# Patient Record
Sex: MALE | Race: WHITE | Employment: OTHER | URBAN - METROPOLITAN AREA
[De-identification: names, ages, dates, MRNs, and addresses within clinical notes are randomized per-mention and may not be internally consistent; named-entity substitution may affect disease eponyms.]

---

## 2017-03-28 ENCOUNTER — OFFICE VISIT (OUTPATIENT)
Dept: GASTROENTEROLOGY | Age: 77
End: 2017-03-28
Payer: MEDICARE

## 2017-03-28 VITALS
RESPIRATION RATE: 14 BRPM | TEMPERATURE: 98.1 F | WEIGHT: 209.7 LBS | OXYGEN SATURATION: 98 % | HEIGHT: 66 IN | SYSTOLIC BLOOD PRESSURE: 198 MMHG | DIASTOLIC BLOOD PRESSURE: 90 MMHG | BODY MASS INDEX: 33.7 KG/M2 | HEART RATE: 68 BPM

## 2017-03-28 DIAGNOSIS — K21.9 GASTROESOPHAGEAL REFLUX DISEASE WITHOUT ESOPHAGITIS: Primary | ICD-10-CM

## 2017-03-28 DIAGNOSIS — K25.9 GASTRIC ULCER, UNSPECIFIED CHRONICITY: ICD-10-CM

## 2017-03-28 DIAGNOSIS — E66.01 MORBID OBESITY, UNSPECIFIED OBESITY TYPE (HCC): ICD-10-CM

## 2017-03-28 PROCEDURE — 4040F PNEUMOC VAC/ADMIN/RCVD: CPT | Performed by: INTERNAL MEDICINE

## 2017-03-28 PROCEDURE — G8484 FLU IMMUNIZE NO ADMIN: HCPCS | Performed by: INTERNAL MEDICINE

## 2017-03-28 PROCEDURE — 1036F TOBACCO NON-USER: CPT | Performed by: INTERNAL MEDICINE

## 2017-03-28 PROCEDURE — G8427 DOCREV CUR MEDS BY ELIG CLIN: HCPCS | Performed by: INTERNAL MEDICINE

## 2017-03-28 PROCEDURE — 99214 OFFICE O/P EST MOD 30 MIN: CPT | Performed by: INTERNAL MEDICINE

## 2017-03-28 PROCEDURE — G8417 CALC BMI ABV UP PARAM F/U: HCPCS | Performed by: INTERNAL MEDICINE

## 2017-03-28 PROCEDURE — 1123F ACP DISCUSS/DSCN MKR DOCD: CPT | Performed by: INTERNAL MEDICINE

## 2017-03-28 RX ORDER — OMEPRAZOLE 20 MG/1
20 CAPSULE, DELAYED RELEASE ORAL
Qty: 90 CAPSULE | Refills: 5 | Status: SHIPPED | OUTPATIENT
Start: 2017-03-28 | End: 2018-01-04 | Stop reason: SDUPTHER

## 2017-03-28 ASSESSMENT — ENCOUNTER SYMPTOMS
GASTROINTESTINAL NEGATIVE: 1
EYES NEGATIVE: 1
RECTAL PAIN: 0
BLOOD IN STOOL: 0
ANAL BLEEDING: 0
RESPIRATORY NEGATIVE: 1
VOMITING: 0
DIARRHEA: 0
ALLERGIC/IMMUNOLOGIC NEGATIVE: 1
NAUSEA: 0
CONSTIPATION: 0
ABDOMINAL DISTENTION: 0
ABDOMINAL PAIN: 0

## 2017-04-27 ENCOUNTER — OFFICE VISIT (OUTPATIENT)
Dept: UROLOGY | Age: 77
End: 2017-04-27
Payer: MEDICARE

## 2017-04-27 VITALS
TEMPERATURE: 98.4 F | BODY MASS INDEX: 33.69 KG/M2 | HEART RATE: 59 BPM | WEIGHT: 209.66 LBS | HEIGHT: 66 IN | DIASTOLIC BLOOD PRESSURE: 85 MMHG | SYSTOLIC BLOOD PRESSURE: 162 MMHG

## 2017-04-27 DIAGNOSIS — C61 PROSTATE CANCER (HCC): Primary | ICD-10-CM

## 2017-04-27 PROCEDURE — 96402 CHEMO HORMON ANTINEOPL SQ/IM: CPT | Performed by: NURSE PRACTITIONER

## 2017-04-27 PROCEDURE — 1036F TOBACCO NON-USER: CPT | Performed by: NURSE PRACTITIONER

## 2017-08-24 DIAGNOSIS — C61 PROSTATE CANCER (HCC): ICD-10-CM

## 2017-08-31 ENCOUNTER — OFFICE VISIT (OUTPATIENT)
Dept: UROLOGY | Age: 77
End: 2017-08-31
Payer: MEDICARE

## 2017-08-31 VITALS
HEART RATE: 62 BPM | DIASTOLIC BLOOD PRESSURE: 89 MMHG | HEIGHT: 66 IN | BODY MASS INDEX: 33.43 KG/M2 | SYSTOLIC BLOOD PRESSURE: 169 MMHG | TEMPERATURE: 98.4 F | WEIGHT: 208 LBS

## 2017-08-31 DIAGNOSIS — C61 PROSTATE CANCER (HCC): Primary | ICD-10-CM

## 2017-08-31 PROCEDURE — 1036F TOBACCO NON-USER: CPT | Performed by: NURSE PRACTITIONER

## 2017-08-31 PROCEDURE — 96402 CHEMO HORMON ANTINEOPL SQ/IM: CPT | Performed by: NURSE PRACTITIONER

## 2017-12-27 DIAGNOSIS — C61 PROSTATE CANCER (HCC): ICD-10-CM

## 2018-01-02 ENCOUNTER — OFFICE VISIT (OUTPATIENT)
Dept: UROLOGY | Age: 78
End: 2018-01-02
Payer: MEDICARE

## 2018-01-02 VITALS
SYSTOLIC BLOOD PRESSURE: 201 MMHG | BODY MASS INDEX: 34.04 KG/M2 | DIASTOLIC BLOOD PRESSURE: 98 MMHG | HEIGHT: 66 IN | HEART RATE: 56 BPM | TEMPERATURE: 98.2 F | WEIGHT: 211.8 LBS

## 2018-01-02 DIAGNOSIS — C61 PROSTATE CANCER (HCC): Primary | ICD-10-CM

## 2018-01-02 PROCEDURE — 96402 CHEMO HORMON ANTINEOPL SQ/IM: CPT | Performed by: UROLOGY

## 2018-01-02 PROCEDURE — 99212 OFFICE O/P EST SF 10 MIN: CPT | Performed by: UROLOGY

## 2018-01-02 ASSESSMENT — ENCOUNTER SYMPTOMS
VOMITING: 0
EYE REDNESS: 0
WHEEZING: 0
COUGH: 0
SHORTNESS OF BREATH: 0
EYE PAIN: 0
BACK PAIN: 0
ABDOMINAL PAIN: 0
NAUSEA: 0
COLOR CHANGE: 0

## 2018-01-04 ENCOUNTER — OFFICE VISIT (OUTPATIENT)
Dept: GASTROENTEROLOGY | Age: 78
End: 2018-01-04
Payer: MEDICARE

## 2018-01-04 VITALS
WEIGHT: 211.7 LBS | DIASTOLIC BLOOD PRESSURE: 88 MMHG | SYSTOLIC BLOOD PRESSURE: 138 MMHG | HEIGHT: 66 IN | BODY MASS INDEX: 34.02 KG/M2 | RESPIRATION RATE: 12 BRPM | HEART RATE: 78 BPM | OXYGEN SATURATION: 100 %

## 2018-01-04 DIAGNOSIS — K21.9 GASTROESOPHAGEAL REFLUX DISEASE WITHOUT ESOPHAGITIS: Primary | ICD-10-CM

## 2018-01-04 DIAGNOSIS — E66.8 MODERATE OBESITY: ICD-10-CM

## 2018-01-04 DIAGNOSIS — Z86.010 HISTORY OF COLON POLYPS: ICD-10-CM

## 2018-01-04 PROCEDURE — 1123F ACP DISCUSS/DSCN MKR DOCD: CPT | Performed by: INTERNAL MEDICINE

## 2018-01-04 PROCEDURE — G8427 DOCREV CUR MEDS BY ELIG CLIN: HCPCS | Performed by: INTERNAL MEDICINE

## 2018-01-04 PROCEDURE — 4040F PNEUMOC VAC/ADMIN/RCVD: CPT | Performed by: INTERNAL MEDICINE

## 2018-01-04 PROCEDURE — G8417 CALC BMI ABV UP PARAM F/U: HCPCS | Performed by: INTERNAL MEDICINE

## 2018-01-04 PROCEDURE — G8484 FLU IMMUNIZE NO ADMIN: HCPCS | Performed by: INTERNAL MEDICINE

## 2018-01-04 PROCEDURE — 99214 OFFICE O/P EST MOD 30 MIN: CPT | Performed by: INTERNAL MEDICINE

## 2018-01-04 PROCEDURE — 1036F TOBACCO NON-USER: CPT | Performed by: INTERNAL MEDICINE

## 2018-01-04 RX ORDER — OMEPRAZOLE 20 MG/1
20 CAPSULE, DELAYED RELEASE ORAL
Qty: 90 CAPSULE | Refills: 5 | Status: SHIPPED | OUTPATIENT
Start: 2018-01-04 | End: 2019-03-25 | Stop reason: SDUPTHER

## 2018-01-04 ASSESSMENT — ENCOUNTER SYMPTOMS
ABDOMINAL PAIN: 0
NAUSEA: 0
VOMITING: 0
EYES NEGATIVE: 1
BLOOD IN STOOL: 0
RESPIRATORY NEGATIVE: 1
ABDOMINAL DISTENTION: 0
DIARRHEA: 0
ANAL BLEEDING: 0
GASTROINTESTINAL NEGATIVE: 1
RECTAL PAIN: 0
CONSTIPATION: 0
ALLERGIC/IMMUNOLOGIC NEGATIVE: 1

## 2018-03-03 ENCOUNTER — APPOINTMENT (OUTPATIENT)
Dept: GENERAL RADIOLOGY | Age: 78
End: 2018-03-03
Payer: MEDICARE

## 2018-03-03 ENCOUNTER — HOSPITAL ENCOUNTER (EMERGENCY)
Age: 78
Discharge: HOME | End: 2018-03-03
Attending: EMERGENCY MEDICINE
Payer: MEDICARE

## 2018-03-03 VITALS
RESPIRATION RATE: 16 BRPM | SYSTOLIC BLOOD PRESSURE: 168 MMHG | OXYGEN SATURATION: 100 % | TEMPERATURE: 97.7 F | HEART RATE: 69 BPM | HEIGHT: 66 IN | WEIGHT: 200 LBS | BODY MASS INDEX: 32.14 KG/M2 | DIASTOLIC BLOOD PRESSURE: 75 MMHG

## 2018-03-03 DIAGNOSIS — J92.9 PLEURAL THICKENING: ICD-10-CM

## 2018-03-03 DIAGNOSIS — R07.81 RIB PAIN ON RIGHT SIDE: Primary | ICD-10-CM

## 2018-03-03 PROCEDURE — 71101 X-RAY EXAM UNILAT RIBS/CHEST: CPT

## 2018-03-03 PROCEDURE — 94664 DEMO&/EVAL PT USE INHALER: CPT

## 2018-03-03 PROCEDURE — 99283 EMERGENCY DEPT VISIT LOW MDM: CPT

## 2018-03-03 RX ORDER — HYDROCODONE BITARTRATE AND ACETAMINOPHEN 5; 325 MG/1; MG/1
1 TABLET ORAL EVERY 6 HOURS PRN
Qty: 15 TABLET | Refills: 0 | Status: SHIPPED | OUTPATIENT
Start: 2018-03-03 | End: 2018-03-06

## 2018-03-03 ASSESSMENT — PAIN SCALES - GENERAL: PAINLEVEL_OUTOF10: 7

## 2018-03-03 ASSESSMENT — PAIN DESCRIPTION - PAIN TYPE: TYPE: ACUTE PAIN

## 2018-03-03 ASSESSMENT — PAIN DESCRIPTION - LOCATION: LOCATION: RIB CAGE

## 2018-03-03 ASSESSMENT — PAIN DESCRIPTION - ORIENTATION: ORIENTATION: RIGHT

## 2018-03-03 NOTE — ED PROVIDER NOTES
exhibits no mass, no laceration, no crepitus, no edema, no deformity, no swelling and no retraction. Pain to palpitation over right lateral ribs 8-10. No crepitus, retractions, palpable fracture. No bruising, cullens or grey dyer sign. Abdominal: Soft. Normal appearance. He exhibits no distension. There is no tenderness. There is no rigidity, no rebound and no guarding. Musculoskeletal: He exhibits tenderness. Neurological: He is alert and oriented to person, place, and time. Skin: Skin is warm, dry and intact. No abrasion, no bruising, no ecchymosis and no rash noted. He is not diaphoretic. No erythema. Nursing note and vitals reviewed. DIAGNOSTIC RESULTS     EKG: All EKG's are interpreted by the Emergency Department Physician who either signs or Co-signs this chart in the absence of a cardiologist.        RADIOLOGY:   All plain film, CT, MRI, and formal ultrasound images (except ED bedside ultrasound) are read by the radiologist, see report below, unless otherwise noted here or in MDM. XR RIBS RIGHT INCLUDE CHEST (MIN 3 VIEWS)   Final Result   No acute rib fracture or complication of rib fracture. Xr Ribs Right Include Chest (min 3 Views)    Result Date: 3/3/2018  EXAMINATION: 4 RIGHT RIB SERIES WITH FRONTAL VIEW OF THE CHEST 3/3/2018 9:41 am COMPARISON: None. HISTORY: ORDERING SYSTEM PROVIDED HISTORY: pain over lateral 8-10 ribs TECHNOLOGIST PROVIDED HISTORY: Reason for exam:->pain over lateral 8-10 ribs Ordering Physician Provided Reason for Exam: right lower lateral rib pain Acuity: Unknown Type of Exam: Unknown FINDINGS: Heart size is enlarged. There is suggested apical pleural thickening bilaterally. There is no lung consolidation, pleural effusion or pneumothorax. There is no acute displaced rib fracture     No acute rib fracture or complication of rib fracture.          LABS:  Labs Reviewed - No data to display    All other labs were within normal range or not returned as of this dictation. EMERGENCY DEPARTMENT COURSE and DIFFERENTIAL DIAGNOSIS/MDM:   Vitals:    Vitals:    03/03/18 0922   BP: (!) 168/75   Pulse: 69   Resp: 16   Temp: 97.7 °F (36.5 °C)   SpO2: 100%   Weight: 200 lb (90.7 kg)   Height: 5' 6\" (1.676 m)         Patient instructed to return to the emergency room if symptoms worsen, return, or any other concern right away which is agreed by the patient    ED MEDS:  Orders Placed This Encounter   Medications    HYDROcodone-acetaminophen (NORCO) 5-325 MG per tablet     Sig: Take 1 tablet by mouth every 6 hours as needed for Pain for up to 3 days . Take lowest dose possible to manage pain. Dispense:  15 tablet     Refill:  0         CONSULTS:  None    PROCEDURES:  None      FINAL IMPRESSION      1. Rib pain on right side    2. Pleural thickening          DISPOSITION/PLAN   DISPOSITION Decision To Discharge    PATIENT REFERRED TO:  Walter Tomlinson    Call in 1 day      [facility] ED  [facility] [address] [phone]    If symptoms worsen      DISCHARGE MEDICATIONS:  New Prescriptions    HYDROCODONE-ACETAMINOPHEN (NORCO) 5-325 MG PER TABLET    Take 1 tablet by mouth every 6 hours as needed for Pain for up to 3 days . Take lowest dose possible to manage pain. Summation      Patient Course: Well appearing, NAD, 75 y/o male with right rib pain that began after lifting suitcases. Pain over right lateral ribs with palpitation, movement, lying. No sob, abd pain, other chest pain. Exam reveals tenderness to palpitation over right lateral ribs. Pain with movement of arm or twisting of torso. Xray shows no rib fracture. Pleural thickening noted. 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 Did inform patient to follow up with PCP for further evaluation. Resp did instruct patient how to use incentive spirometry. At this time, pt is clinically stable for discharge home.   Low suspicion for PE, MI,

## 2018-03-03 NOTE — ED PROVIDER NOTES
[facility] [address] [facility] ED  Emergency Department  Independent Attestation     Pt Name: Israel Alford  MRN: [id number]  [place] 1940  Date of evaluation: 3/3/18       Israel Alford is a 76 y.o. male who presents with No chief complaint on file. I was personally available for consultation in the Emergency Department. I have reviewed the chart and agree with the documentation as recorded by the [facility], including the assessment, treatment plan and disposition.     Rosey Messer DO  Attending Emergency Physician  [address] [facility] ED      (Please note that portions of this note were completed with a voice recognition program.  Efforts were made to edit the dictations but occasionally words are mis-transcribed.)        Rosey Messer DO  03/03/18 7447

## 2018-06-26 LAB — PROSTATE SPECIFIC ANTIGEN: NORMAL NG/ML

## 2018-07-03 ENCOUNTER — OFFICE VISIT (OUTPATIENT)
Dept: UROLOGY | Age: 78
End: 2018-07-03
Payer: MEDICARE

## 2018-07-03 ENCOUNTER — TELEPHONE (OUTPATIENT)
Dept: UROLOGY | Age: 78
End: 2018-07-03

## 2018-07-03 VITALS
HEIGHT: 66 IN | TEMPERATURE: 98.2 F | WEIGHT: 203 LBS | SYSTOLIC BLOOD PRESSURE: 163 MMHG | DIASTOLIC BLOOD PRESSURE: 96 MMHG | HEART RATE: 79 BPM | BODY MASS INDEX: 32.62 KG/M2

## 2018-07-03 DIAGNOSIS — C61 PROSTATE CANCER (HCC): Primary | ICD-10-CM

## 2018-07-03 PROCEDURE — 1123F ACP DISCUSS/DSCN MKR DOCD: CPT | Performed by: UROLOGY

## 2018-07-03 PROCEDURE — 99212 OFFICE O/P EST SF 10 MIN: CPT | Performed by: UROLOGY

## 2018-07-03 PROCEDURE — 1036F TOBACCO NON-USER: CPT | Performed by: UROLOGY

## 2018-07-03 PROCEDURE — G8427 DOCREV CUR MEDS BY ELIG CLIN: HCPCS | Performed by: UROLOGY

## 2018-07-03 PROCEDURE — 1101F PT FALLS ASSESS-DOCD LE1/YR: CPT | Performed by: UROLOGY

## 2018-07-03 PROCEDURE — 4040F PNEUMOC VAC/ADMIN/RCVD: CPT | Performed by: UROLOGY

## 2018-07-03 PROCEDURE — G8417 CALC BMI ABV UP PARAM F/U: HCPCS | Performed by: UROLOGY

## 2018-07-03 PROCEDURE — 96402 CHEMO HORMON ANTINEOPL SQ/IM: CPT | Performed by: UROLOGY

## 2018-07-03 RX ORDER — VALSARTAN 80 MG/1
TABLET ORAL
COMMUNITY
Start: 2018-06-15

## 2018-07-03 NOTE — PROGRESS NOTES
After obtaining consent, and per orders of Dr. Jerome Moran, injection of Lupron given in Left arm by Moises Pop. Patient instructed to remain in clinic for 20 minutes afterwards, and to report any adverse reaction to me immediately.
capsule Take 1 capsule by mouth every morning (before breakfast) 90 capsule 5    aspirin 81 MG EC tablet Take 81 mg by mouth daily.  Multiple Vitamin (MULTIVITAMIN PO) Take  by mouth daily.  Cholecalciferol (VITAMIN D) 2000 UNITS TABS Take  by mouth Daily.  cetirizine (ZYRTEC) 10 MG tablet Take 10 mg by mouth daily.  simvastatin (ZOCOR) 40 MG tablet Take 40 mg by mouth nightly. Patient has no known allergies. History   Smoking Status    Never Smoker   Smokeless Tobacco    Never Used     (If patient a smoker, smoking cessation counseling offered)    History   Alcohol Use    Yes     Comment: occasionally       REVIEW OF SYSTEMS:  Review of Systems    Physical Exam:      Vitals:    07/03/18 1353   BP: (!) 163/96   Pulse: 79   Temp:      Body mass index is 32.77 kg/m². Patient is a [de-identified] y.o. male in no acute distress and alert and oriented to person, place and time. Physical Exam  Constitutional: Patient in no acute distress. Neuro: Alert and oriented to person, place and time. Psych: Mood normal, affect normal  Lungs: Respiratory effort is normal  Cardiovascular: Warm & Pink  Abdomen: Soft, non-tender, non-distended with no CVA,  No flank tenderness,  Or hepatosplenomegaly   Lymphatics: No palpable lymphadenopathy. Bladder non-tender and not distended. Musculoskeletal: Normal gait and station      Assessment and Plan      1. Prostate cancer [facility])           Plan:         Continues to do well. Lupron today. F/U in 6 months with a PSA. Vit D and calcium  Return in about 6 months (around 1/3/2019) for Labs. Prescriptions Ordered:  No orders of the defined types were placed in this encounter.      Orders Placed:  Orders Placed This Encounter   Procedures    PSA, Diagnostic     Standing Status:   Future     Standing Expiration Date:   7/3/2019          Peewee Hinton MD

## 2018-07-05 DIAGNOSIS — C61 PROSTATE CANCER (HCC): ICD-10-CM

## 2018-12-06 ENCOUNTER — OFFICE VISIT (OUTPATIENT)
Dept: GASTROENTEROLOGY | Age: 78
End: 2018-12-06
Payer: MEDICARE

## 2018-12-06 VITALS
DIASTOLIC BLOOD PRESSURE: 70 MMHG | SYSTOLIC BLOOD PRESSURE: 136 MMHG | BODY MASS INDEX: 32.77 KG/M2 | HEART RATE: 68 BPM | WEIGHT: 203 LBS

## 2018-12-06 DIAGNOSIS — K21.9 GASTROESOPHAGEAL REFLUX DISEASE WITHOUT ESOPHAGITIS: Primary | ICD-10-CM

## 2018-12-06 DIAGNOSIS — Z86.010 HISTORY OF COLON POLYPS: ICD-10-CM

## 2018-12-06 PROCEDURE — G8484 FLU IMMUNIZE NO ADMIN: HCPCS | Performed by: INTERNAL MEDICINE

## 2018-12-06 PROCEDURE — G8417 CALC BMI ABV UP PARAM F/U: HCPCS | Performed by: INTERNAL MEDICINE

## 2018-12-06 PROCEDURE — 1036F TOBACCO NON-USER: CPT | Performed by: INTERNAL MEDICINE

## 2018-12-06 PROCEDURE — 99214 OFFICE O/P EST MOD 30 MIN: CPT | Performed by: INTERNAL MEDICINE

## 2018-12-06 PROCEDURE — 4040F PNEUMOC VAC/ADMIN/RCVD: CPT | Performed by: INTERNAL MEDICINE

## 2018-12-06 PROCEDURE — G8427 DOCREV CUR MEDS BY ELIG CLIN: HCPCS | Performed by: INTERNAL MEDICINE

## 2018-12-06 PROCEDURE — 1123F ACP DISCUSS/DSCN MKR DOCD: CPT | Performed by: INTERNAL MEDICINE

## 2018-12-06 PROCEDURE — 1101F PT FALLS ASSESS-DOCD LE1/YR: CPT | Performed by: INTERNAL MEDICINE

## 2018-12-06 ASSESSMENT — ENCOUNTER SYMPTOMS
NAUSEA: 0
DIARRHEA: 0
ABDOMINAL PAIN: 0
RECTAL PAIN: 0
BACK PAIN: 0
BLOOD IN STOOL: 0
SINUS PAIN: 0
TROUBLE SWALLOWING: 0
CHEST TIGHTNESS: 0
CONSTIPATION: 0
SORE THROAT: 0
COUGH: 0
ABDOMINAL DISTENTION: 0
ANAL BLEEDING: 0
VOMITING: 0
SHORTNESS OF BREATH: 0
SINUS PRESSURE: 0

## 2018-12-06 NOTE — PROGRESS NOTES
Subjective:      Patient ID: Sunni Swift is a [de-identified] y.o. male. Dr. Bullock Route our mutual patient Sunni Swift was seen  for   1. Gastroesophageal reflux disease without esophagitis    2. History of colon polyps     . Here for f/u after 10 months  Has been on PPI  Pt is clinically feeling well GI wise  Has No significant abdominal pains, bloating or cramping  Has no sig GERD symptoms  Has no Dysphagia, No Nausea or any vomiting  Denies any rectal bleeding or any melena  Denies any sig constipation or any diarrhea symptoms  Weight is stable and appetite is generally good. Past Medical, Family, and Social History reviewed and does contribute to the patient presenting condition. patient\"s PMH/PSH,SH,PSYCH hx, MEDs, ALLERGIES, and ROS was all reviewed and updated ion the appropriate sections        Gastroesophageal Reflux   He reports no abdominal pain, no chest pain, no coughing, no nausea or no sore throat. Pertinent negatives include no fatigue. Review of Systems   Constitutional: Negative for appetite change, fatigue and fever. HENT: Negative for congestion, sinus pain, sinus pressure, sore throat and trouble swallowing. Eyes: Positive for visual disturbance (glasses). Respiratory: Negative for cough, chest tightness and shortness of breath. Cardiovascular: Negative for chest pain, palpitations and leg swelling. Gastrointestinal: Negative for abdominal distention, abdominal pain, anal bleeding, blood in stool, constipation, diarrhea, nausea, rectal pain and vomiting. Endocrine: Negative. Genitourinary: Negative for difficulty urinating. Musculoskeletal: Negative for arthralgias, back pain and gait problem. Skin: Negative. Allergic/Immunologic: Negative for environmental allergies and food allergies. Neurological: Negative for dizziness, weakness, light-headedness and headaches. Hematological: Does not bruise/bleed easily.    Psychiatric/Behavioral: Negative for sleep disturbance. The patient is not nervous/anxious. Reviewed and agree  Objective:   Physical Exam   Constitutional: He is oriented to person, place, and time. He appears well-developed and well-nourished. Mild obesity    HENT:   Head: Normocephalic and atraumatic. Eyes: Pupils are equal, round, and reactive to light. Conjunctivae and EOM are normal.   Neck: Normal range of motion. Neck supple. Cardiovascular: Normal rate and regular rhythm. Pulmonary/Chest: Effort normal and breath sounds normal.   Abdominal: Soft. Bowel sounds are normal.   NON TENDER, NON DISTENTED  LIVER SPLEEN AND HERNIAS ARE NOT  PALPABLE  BOWEL SOUNDS ARE POSITIVE      Genitourinary: Rectum normal.   Musculoskeletal: Normal range of motion. Neurological: He is alert and oriented to person, place, and time. He has normal reflexes. Skin: Skin is warm. Vitals reviewed. Assessment:      As above      Plan:      Cont PPI - renew    Pt was discussed in detail about the possible side effects of proton pump inhibiter therapy. He was explained about the possibility of calcium and magnesium malabsorption and was advised to start taking calcium supplements with Vit D. Some over the counter regimens were explained to patient. Some dietary advices were also given. He has verbalized understanding and agreement to this. Pt seems to have signs and symptoms consistent with GERD, acid indigestion and heartburns. He was discussed  in detail about some possible life style and dietary modifications. He was stressed about the maintenance  of appropriate weight and effect of obesity contributing to reflux symptoms. Routine exercise was streesed. Avoidance of Caffeine, nicotine and chocolate were explained. Pt was asked to avoid spices grease and fried food. Advices were also given about avoidance of any kind of fast foods, soda pops and high energy drinks.     Pt was advised to place two small block under the head end of the bed which may help with night time reflux. Was advised not to eat any thin at least 2-3 hrs before going to bed and walk especially after dinner    Pt has verbalized understanding and agreement to this plan. Pt was advised in detail about some life style and dietary modifications. He was advised about avoidance of caffeine, nicotine and chocolate. Pt was also told to stay away from any kind of fast foods, soda pops. He was also advised to avoid lots of spices, grease and fried food etc.     Instructions were also given about trying to arrange the timing, quality and quantity of food. Instructions were given about using ample amount of fiber including dietary and supplemental fiber either metamucil, bennafiber or citrucell etc.  Pt was advised about drinking ample amount of water without any colors or chemicals. Stress was given about regular exercise. Pt has verbalized understanding and agreement to these modifications.                 Boris Cesar MD

## 2019-01-08 ENCOUNTER — OFFICE VISIT (OUTPATIENT)
Dept: UROLOGY | Age: 79
End: 2019-01-08
Payer: MEDICARE

## 2019-01-08 VITALS
BODY MASS INDEX: 32.69 KG/M2 | HEIGHT: 66 IN | HEART RATE: 75 BPM | DIASTOLIC BLOOD PRESSURE: 84 MMHG | WEIGHT: 203.4 LBS | SYSTOLIC BLOOD PRESSURE: 132 MMHG | TEMPERATURE: 98.4 F

## 2019-01-08 DIAGNOSIS — C61 PROSTATE CANCER (HCC): Primary | ICD-10-CM

## 2019-01-08 PROCEDURE — G8484 FLU IMMUNIZE NO ADMIN: HCPCS | Performed by: UROLOGY

## 2019-01-08 PROCEDURE — 1036F TOBACCO NON-USER: CPT | Performed by: UROLOGY

## 2019-01-08 PROCEDURE — 4040F PNEUMOC VAC/ADMIN/RCVD: CPT | Performed by: UROLOGY

## 2019-01-08 PROCEDURE — G8427 DOCREV CUR MEDS BY ELIG CLIN: HCPCS | Performed by: UROLOGY

## 2019-01-08 PROCEDURE — 99212 OFFICE O/P EST SF 10 MIN: CPT | Performed by: UROLOGY

## 2019-01-08 PROCEDURE — G8417 CALC BMI ABV UP PARAM F/U: HCPCS | Performed by: UROLOGY

## 2019-01-08 PROCEDURE — 1101F PT FALLS ASSESS-DOCD LE1/YR: CPT | Performed by: UROLOGY

## 2019-01-08 PROCEDURE — 96402 CHEMO HORMON ANTINEOPL SQ/IM: CPT | Performed by: UROLOGY

## 2019-01-08 PROCEDURE — 1123F ACP DISCUSS/DSCN MKR DOCD: CPT | Performed by: UROLOGY

## 2019-01-08 ASSESSMENT — ENCOUNTER SYMPTOMS
NAUSEA: 0
EYE REDNESS: 0
VOMITING: 0
EYE PAIN: 0
SHORTNESS OF BREATH: 0
COUGH: 0
ABDOMINAL PAIN: 0
COLOR CHANGE: 0
BACK PAIN: 0
WHEEZING: 0

## 2019-03-27 RX ORDER — OMEPRAZOLE 20 MG/1
CAPSULE, DELAYED RELEASE ORAL
Qty: 90 CAPSULE | Refills: 5 | Status: SHIPPED | OUTPATIENT
Start: 2019-03-27 | End: 2020-03-30

## 2019-07-09 ENCOUNTER — OFFICE VISIT (OUTPATIENT)
Dept: UROLOGY | Age: 79
End: 2019-07-09
Payer: MEDICARE

## 2019-07-09 VITALS
HEIGHT: 66 IN | TEMPERATURE: 98.6 F | WEIGHT: 191 LBS | BODY MASS INDEX: 30.7 KG/M2 | SYSTOLIC BLOOD PRESSURE: 124 MMHG | DIASTOLIC BLOOD PRESSURE: 82 MMHG

## 2019-07-09 DIAGNOSIS — R35.1 NOCTURIA: Primary | ICD-10-CM

## 2019-07-09 DIAGNOSIS — C61 PROSTATE CANCER (HCC): ICD-10-CM

## 2019-07-09 PROCEDURE — 1123F ACP DISCUSS/DSCN MKR DOCD: CPT | Performed by: UROLOGY

## 2019-07-09 PROCEDURE — G8417 CALC BMI ABV UP PARAM F/U: HCPCS | Performed by: UROLOGY

## 2019-07-09 PROCEDURE — 1036F TOBACCO NON-USER: CPT | Performed by: UROLOGY

## 2019-07-09 PROCEDURE — 4040F PNEUMOC VAC/ADMIN/RCVD: CPT | Performed by: UROLOGY

## 2019-07-09 PROCEDURE — 99213 OFFICE O/P EST LOW 20 MIN: CPT | Performed by: UROLOGY

## 2019-07-09 PROCEDURE — 96402 CHEMO HORMON ANTINEOPL SQ/IM: CPT | Performed by: UROLOGY

## 2019-07-09 PROCEDURE — G8427 DOCREV CUR MEDS BY ELIG CLIN: HCPCS | Performed by: UROLOGY

## 2019-07-09 ASSESSMENT — ENCOUNTER SYMPTOMS
CONSTIPATION: 0
WHEEZING: 0
EYE PAIN: 0
VOMITING: 0
DIARRHEA: 0
EYE REDNESS: 0
NAUSEA: 0
SHORTNESS OF BREATH: 0
ABDOMINAL PAIN: 0
COUGH: 0
BACK PAIN: 0

## 2019-07-09 NOTE — PROGRESS NOTES
Review of Systems   Constitutional: Negative for appetite change, chills and fever. Eyes: Negative for pain, redness and visual disturbance. Respiratory: Negative for cough, shortness of breath and wheezing. Cardiovascular: Negative for chest pain and leg swelling. Gastrointestinal: Negative for abdominal pain, constipation, diarrhea, nausea and vomiting. Genitourinary: Negative for difficulty urinating, dysuria, flank pain, frequency, hematuria and urgency. Musculoskeletal: Negative for back pain, joint swelling and myalgias. Skin: Negative for rash and wound. Neurological: Negative for dizziness, tremors and numbness. Hematological: Does not bruise/bleed easily.
Soft, non-tender, non-distended with no CVA,  No flank tenderness,  Or hepatosplenomegaly   Lymphatics: No palpablelymphadenopathy. Bladder non-tender and not distended. Musculoskeletal: Normal gait and station      Assessment and Plan      1. Nocturia    2. Prostate cancer ([facility] [address]           Plan:          Doing well  Lupron today  Continue Vit D and calcium  F/U in 6 months with Lupron and a PSA. Return in about 6 months (around 1/9/2020) for Labs. Prescriptions Ordered:  No orders of the defined types were placed in this encounter. Orders Placed:  Orders Placed This Encounter   Procedures    PSA, Diagnostic     Standing Status:   Future     Standing Expiration Date:   7/8/2020           Jasmin Chavarria MD    Agree with the ROS entered by the MA.

## 2020-01-14 ENCOUNTER — OFFICE VISIT (OUTPATIENT)
Dept: UROLOGY | Age: 80
End: 2020-01-14
Payer: MEDICARE

## 2020-01-14 VITALS
DIASTOLIC BLOOD PRESSURE: 80 MMHG | BODY MASS INDEX: 32.59 KG/M2 | HEART RATE: 87 BPM | HEIGHT: 66 IN | SYSTOLIC BLOOD PRESSURE: 122 MMHG | WEIGHT: 202.8 LBS | TEMPERATURE: 98.4 F

## 2020-01-14 PROCEDURE — 1123F ACP DISCUSS/DSCN MKR DOCD: CPT | Performed by: UROLOGY

## 2020-01-14 PROCEDURE — 1036F TOBACCO NON-USER: CPT | Performed by: UROLOGY

## 2020-01-14 PROCEDURE — 99213 OFFICE O/P EST LOW 20 MIN: CPT | Performed by: UROLOGY

## 2020-01-14 PROCEDURE — G8484 FLU IMMUNIZE NO ADMIN: HCPCS | Performed by: UROLOGY

## 2020-01-14 PROCEDURE — 4040F PNEUMOC VAC/ADMIN/RCVD: CPT | Performed by: UROLOGY

## 2020-01-14 PROCEDURE — G8427 DOCREV CUR MEDS BY ELIG CLIN: HCPCS | Performed by: UROLOGY

## 2020-01-14 PROCEDURE — G8417 CALC BMI ABV UP PARAM F/U: HCPCS | Performed by: UROLOGY

## 2020-01-14 PROCEDURE — 96402 CHEMO HORMON ANTINEOPL SQ/IM: CPT | Performed by: UROLOGY

## 2020-01-14 RX ORDER — SULFAMETHOXAZOLE AND TRIMETHOPRIM 800; 160 MG/1; MG/1
TABLET ORAL
COMMUNITY
Start: 2020-01-13 | End: 2020-07-28 | Stop reason: ALTCHOICE

## 2020-01-14 RX ORDER — LISINOPRIL 20 MG/1
TABLET ORAL
COMMUNITY
Start: 2019-12-07 | End: 2021-01-26 | Stop reason: ALTCHOICE

## 2020-01-14 ASSESSMENT — ENCOUNTER SYMPTOMS
SHORTNESS OF BREATH: 0
BACK PAIN: 0
CONSTIPATION: 0
NAUSEA: 0
DIARRHEA: 0
ABDOMINAL PAIN: 0
WHEEZING: 0
COUGH: 0
EYE PAIN: 0
VOMITING: 0
EYE REDNESS: 0

## 2020-01-14 NOTE — PROGRESS NOTES
Review of Systems   Constitutional: Negative for appetite change, chills and fatigue. Eyes: Negative for pain, redness and visual disturbance. Respiratory: Negative for cough, shortness of breath and wheezing. Cardiovascular: Negative for chest pain and leg swelling. Gastrointestinal: Negative for abdominal pain, constipation, diarrhea, nausea and vomiting. Genitourinary: Negative for difficulty urinating, dysuria, flank pain, frequency, hematuria and urgency. Musculoskeletal: Negative for back pain, joint swelling and myalgias. Skin: Negative for rash and wound. Neurological: Negative for dizziness, weakness and numbness. Hematological: Does not bruise/bleed easily.
Lab/Culture results: PSA 0.07    Imaging Reviewed during this Office Visit: none  (results were independently reviewed by physician and radiology report verified)    PAST MEDICAL, FAMILY AND SOCIAL HISTORY UPDATE:  Past Medical History:   Diagnosis Date    Abdominal pain     Cancer ([address]     Hx of prostate    Gastric ulcer     GERD (gastroesophageal reflux disease)      Past Surgical History:   Procedure Laterality Date    COLONOSCOPY  2010    per pt, \"some polyps\"    UPPER GASTROINTESTINAL ENDOSCOPY  6/27/11,  11/7/2011     No family history on file. Outpatient Medications Marked as Taking for the 1/14/20 encounter (Office Visit) with Melia Ríos MD   Medication Sig Dispense Refill    sulfamethoxazole-trimethoprim (BACTRIM DS;SEPTRA DS) 800-160 MG per tablet       lisinopril (PRINIVIL;ZESTRIL) 20 MG tablet TAKE 1 TABLET BY MOUTH ONCE DAILY FOR 90 DAYS      omeprazole (PRILOSEC) 20 MG delayed release capsule TAKE ONE CAPSULE BY MOUTH ONCE DAILY IN THE MORNING BEFORE BREAKFAST 90 capsule 5    valsartan (DIOVAN) 80 MG tablet       aspirin 81 MG EC tablet Take 81 mg by mouth daily.  Multiple Vitamin (MULTIVITAMIN PO) Take  by mouth daily.  Cholecalciferol (VITAMIN D) 2000 UNITS TABS Take  by mouth Daily.  cetirizine (ZYRTEC) 10 MG tablet Take 10 mg by mouth daily.  simvastatin (ZOCOR) 40 MG tablet Take 40 mg by mouth nightly. Patient has no known allergies. Social History     Tobacco Use   Smoking Status Never Smoker   Smokeless Tobacco Never Used     (Ifpatient a smoker, smoking cessation counseling offered)    Social History     Substance and Sexual Activity   Alcohol Use Yes    Comment: occasionally       REVIEW OF SYSTEMS:  Review of Systems    Physical Exam:      Vitals:    01/14/20 1246   BP: 122/80   Pulse: 87   Temp: 98.4 °F (36.9 °C)     Body mass index is 32.73 kg/m².   Patient is a 78 y.o. male in no acute distress and alert and oriented to person, place

## 2020-04-01 RX ORDER — OMEPRAZOLE 20 MG/1
CAPSULE, DELAYED RELEASE ORAL
Qty: 90 CAPSULE | Refills: 1 | Status: SHIPPED | OUTPATIENT
Start: 2020-04-01

## 2020-07-28 ENCOUNTER — OFFICE VISIT (OUTPATIENT)
Dept: UROLOGY | Age: 80
End: 2020-07-28
Payer: MEDICARE

## 2020-07-28 VITALS — TEMPERATURE: 98.3 F

## 2020-07-28 PROCEDURE — 1123F ACP DISCUSS/DSCN MKR DOCD: CPT | Performed by: UROLOGY

## 2020-07-28 PROCEDURE — 4040F PNEUMOC VAC/ADMIN/RCVD: CPT | Performed by: UROLOGY

## 2020-07-28 PROCEDURE — 1036F TOBACCO NON-USER: CPT | Performed by: UROLOGY

## 2020-07-28 PROCEDURE — G8427 DOCREV CUR MEDS BY ELIG CLIN: HCPCS | Performed by: UROLOGY

## 2020-07-28 PROCEDURE — 99213 OFFICE O/P EST LOW 20 MIN: CPT | Performed by: UROLOGY

## 2020-07-28 PROCEDURE — 96402 CHEMO HORMON ANTINEOPL SQ/IM: CPT | Performed by: UROLOGY

## 2020-07-28 PROCEDURE — G8417 CALC BMI ABV UP PARAM F/U: HCPCS | Performed by: UROLOGY

## 2020-07-28 ASSESSMENT — ENCOUNTER SYMPTOMS
BACK PAIN: 0
ABDOMINAL PAIN: 0
SHORTNESS OF BREATH: 0
EYE REDNESS: 0
VOMITING: 0
COUGH: 0
COLOR CHANGE: 0
NAUSEA: 0
EYE PAIN: 0
WHEEZING: 0

## 2020-07-28 NOTE — PROGRESS NOTES
20 MG delayed release capsule TAKE 1 CAPSULE BY MOUTH ONCE DAILY IN THE MORNING BEFORE BREAKFAST 90 capsule 1    lisinopril (PRINIVIL;ZESTRIL) 20 MG tablet TAKE 1 TABLET BY MOUTH ONCE DAILY FOR 90 DAYS      valsartan (DIOVAN) 80 MG tablet       aspirin 81 MG EC tablet Take 81 mg by mouth daily.  Multiple Vitamin (MULTIVITAMIN PO) Take  by mouth daily.  Cholecalciferol (VITAMIN D) 2000 UNITS TABS Take  by mouth Daily.  cetirizine (ZYRTEC) 10 MG tablet Take 10 mg by mouth daily.  simvastatin (ZOCOR) 40 MG tablet Take 40 mg by mouth nightly. Patient has no known allergies. Social History     Tobacco Use   Smoking Status Never Smoker   Smokeless Tobacco Never Used     (Ifpatient a smoker, smoking cessation counseling offered)    Social History     Substance and Sexual Activity   Alcohol Use Yes    Comment: occasionally       REVIEW OF SYSTEMS:  Review of Systems    Physical Exam:      Vitals:    07/28/20 1313   Temp: 98.3 °F (36.8 °C)     There is no height or weight on file to calculate BMI. Patient is a 78 y.o. male in no acute distress and alert and oriented to person, place and time. Physical Exam  Constitutional: Patient in no acute distress. Neuro: Alert and oriented to person, place and time. Psych: Mood normal, affect normal  Skin: No rash noted  Lungs: Respiratory effort is normal  Cardiovascular: Warm & Pink  Abdomen: Soft, non-tender, non-distended with no CVA,  No flank tenderness,  Or hepatosplenomegaly   Lymphatics: No palpablelymphadenopathy. Bladder non-tender and not distended. Musculoskeletal: Normal gait and station      Assessment and Plan      1. Prostate cancer ([facility] [address]    2. Nocturia           Plan:          He is doing well with ADT  He is due for an injection today. Restrict fluids at night for nocturia. F/U in 6 months with a PSA  Return in about 6 months (around 1/28/2021) for Labs.     Prescriptions Ordered:  No orders of the defined types were placed in this encounter. Orders Placed:  Orders Placed This Encounter   Procedures    PSA, Diagnostic     Standing Status:   Future     Standing Expiration Date:   7/28/2021           Shubham Pace MD    Agree with the ROS entered by the MA.

## 2021-01-26 ENCOUNTER — OFFICE VISIT (OUTPATIENT)
Dept: UROLOGY | Age: 81
End: 2021-01-26
Payer: MEDICARE

## 2021-01-26 VITALS — TEMPERATURE: 98.2 F | DIASTOLIC BLOOD PRESSURE: 90 MMHG | HEART RATE: 72 BPM | SYSTOLIC BLOOD PRESSURE: 165 MMHG

## 2021-01-26 DIAGNOSIS — R35.1 NOCTURIA: ICD-10-CM

## 2021-01-26 DIAGNOSIS — C61 PROSTATE CANCER (HCC): Primary | ICD-10-CM

## 2021-01-26 PROCEDURE — 1036F TOBACCO NON-USER: CPT | Performed by: UROLOGY

## 2021-01-26 PROCEDURE — 4040F PNEUMOC VAC/ADMIN/RCVD: CPT | Performed by: UROLOGY

## 2021-01-26 PROCEDURE — 1123F ACP DISCUSS/DSCN MKR DOCD: CPT | Performed by: UROLOGY

## 2021-01-26 PROCEDURE — G8427 DOCREV CUR MEDS BY ELIG CLIN: HCPCS | Performed by: UROLOGY

## 2021-01-26 PROCEDURE — 99213 OFFICE O/P EST LOW 20 MIN: CPT | Performed by: UROLOGY

## 2021-01-26 PROCEDURE — G8484 FLU IMMUNIZE NO ADMIN: HCPCS | Performed by: UROLOGY

## 2021-01-26 PROCEDURE — G8421 BMI NOT CALCULATED: HCPCS | Performed by: UROLOGY

## 2021-01-26 PROCEDURE — 96402 CHEMO HORMON ANTINEOPL SQ/IM: CPT | Performed by: UROLOGY

## 2021-01-26 RX ORDER — LISINOPRIL AND HYDROCHLOROTHIAZIDE 20; 12.5 MG/1; MG/1
TABLET ORAL
COMMUNITY
Start: 2020-12-30

## 2021-01-26 ASSESSMENT — ENCOUNTER SYMPTOMS
COUGH: 0
COLOR CHANGE: 0
NAUSEA: 0
EYE REDNESS: 0
WHEEZING: 0
BACK PAIN: 0
SHORTNESS OF BREATH: 0
VOMITING: 0
ABDOMINAL PAIN: 0
EYE PAIN: 0

## 2021-01-26 NOTE — PROGRESS NOTES
results: none    Imaging Reviewed during this Office Visit: none  (results were independently reviewed by physician and radiology report verified)    PAST MEDICAL, FAMILY AND SOCIAL HISTORY UPDATE:  Past Medical History:   Diagnosis Date    Abdominal pain     Cancer ([address]     Hx of prostate    Gastric ulcer     GERD (gastroesophageal reflux disease)      Past Surgical History:   Procedure Laterality Date    COLONOSCOPY  2010    per pt, \"some polyps\"    UPPER GASTROINTESTINAL ENDOSCOPY  6/27/11,  11/7/2011     No family history on file. Outpatient Medications Marked as Taking for the 1/26/21 encounter (Office Visit) with Avi Perales MD   Medication Sig Dispense Refill    lisinopril-hydroCHLOROthiazide (PRINZIDE;ZESTORETIC) 20-12.5 MG per tablet TAKE 1 TABLET BY MOUTH ONCE DAILY FOR 90 DAYS      omeprazole (PRILOSEC) 20 MG delayed release capsule TAKE 1 CAPSULE BY MOUTH ONCE DAILY IN THE MORNING BEFORE BREAKFAST 90 capsule 1    valsartan (DIOVAN) 80 MG tablet       aspirin 81 MG EC tablet Take 81 mg by mouth daily.  Multiple Vitamin (MULTIVITAMIN PO) Take  by mouth daily.  Cholecalciferol (VITAMIN D) 2000 UNITS TABS Take  by mouth Daily.  cetirizine (ZYRTEC) 10 MG tablet Take 10 mg by mouth daily.  simvastatin (ZOCOR) 40 MG tablet Take 40 mg by mouth nightly. Patient has no known allergies. Social History     Tobacco Use   Smoking Status Never Smoker   Smokeless Tobacco Never Used     (Ifpatient a smoker, smoking cessation counseling offered)    Social History     Substance and Sexual Activity   Alcohol Use Yes    Comment: occasionally       REVIEW OF SYSTEMS:  Review of Systems    Physical Exam:      Vitals:    01/26/21 1429   BP: (!) 165/90   Pulse: 72   Temp: 98.2 °F (36.8 °C)     There is no height or weight on file to calculate BMI. Patient is a 78 y.o. male in no acute distress and alert and oriented to person, place and time.   Physical Exam  Constitutional: Patient in no acute distress. Neuro: Alert and oriented to person, place and time. Psych: Mood normal, affect normal  Lungs: Respiratory effort is normal  Cardiovascular: Warm & Pink  Abdomen: Soft, non-tender, non-distended with no CVA,  No flank tenderness,  Or hepatosplenomegaly   Lymphatics: No palpablelymphadenopathy. Bladder non-tender and not distended. Musculoskeletal: Normal gait and station      Assessment and Plan      1. Prostate cancer ([address]    2. Nocturia           Plan:          Doing well  Due for Eligard today. Restrict fluids before bed. Return in about 6 months (around 7/26/2021) for Labs. Prescriptions Ordered:  No orders of the defined types were placed in this encounter. Orders Placed:  Orders Placed This Encounter   Procedures    PSA, Diagnostic     Standing Status:   Future     Standing Expiration Date:   1/26/2022           Ami Chaparro MD    Agree with the ROS entered by the MA.

## 2021-01-26 NOTE — PROGRESS NOTES
After obtaining consent, and per orders of Dr. Anahi Mccarty, injection of Eligard 45 mg  given in Right arm by Cydney Maki. Patient instructed to remain in clinic for 20 minutes afterwards, and to report any adverse reaction to me immediately. Reviewed with pt to start taking Calcium 600 mg vitamin. Sheet given to pt with preferred dose. Pt gave verbal understanding.

## 2021-07-27 ENCOUNTER — OFFICE VISIT (OUTPATIENT)
Dept: UROLOGY | Age: 81
End: 2021-07-27
Payer: MEDICARE

## 2021-07-27 VITALS
DIASTOLIC BLOOD PRESSURE: 85 MMHG | BODY MASS INDEX: 32.47 KG/M2 | SYSTOLIC BLOOD PRESSURE: 133 MMHG | HEIGHT: 66 IN | WEIGHT: 202 LBS | TEMPERATURE: 97.1 F | HEART RATE: 69 BPM

## 2021-07-27 DIAGNOSIS — C61 PROSTATE CANCER (HCC): Primary | ICD-10-CM

## 2021-07-27 DIAGNOSIS — R35.1 NOCTURIA: ICD-10-CM

## 2021-07-27 PROCEDURE — G8427 DOCREV CUR MEDS BY ELIG CLIN: HCPCS | Performed by: UROLOGY

## 2021-07-27 PROCEDURE — 99213 OFFICE O/P EST LOW 20 MIN: CPT | Performed by: UROLOGY

## 2021-07-27 PROCEDURE — G8417 CALC BMI ABV UP PARAM F/U: HCPCS | Performed by: UROLOGY

## 2021-07-27 PROCEDURE — 1036F TOBACCO NON-USER: CPT | Performed by: UROLOGY

## 2021-07-27 PROCEDURE — 4040F PNEUMOC VAC/ADMIN/RCVD: CPT | Performed by: UROLOGY

## 2021-07-27 PROCEDURE — 1123F ACP DISCUSS/DSCN MKR DOCD: CPT | Performed by: UROLOGY

## 2021-07-27 PROCEDURE — 96402 CHEMO HORMON ANTINEOPL SQ/IM: CPT | Performed by: UROLOGY

## 2021-07-27 ASSESSMENT — ENCOUNTER SYMPTOMS
EYE PAIN: 0
DIARRHEA: 0
VOMITING: 0
WHEEZING: 0
ABDOMINAL PAIN: 0
BACK PAIN: 0
COUGH: 0
NAUSEA: 0
EYE REDNESS: 0
CONSTIPATION: 0
SHORTNESS OF BREATH: 0

## 2021-07-27 NOTE — PROGRESS NOTES
[address] [address]  Dept:  [facility] Urology Office Note - Established    Patient:  Maxim Alanis  YOB: 1940  Date: 7/27/2021    The patient is a 78 y.o. male who presents todayfor evaluation of the following problems:   Chief Complaint   Patient presents with    6 Month Follow-Up     here for follow up for eligard       HPI  He is here in follow up for prostate cancer. He has been doing well with ADT for the last 10 years. No new issues. PSA is 0.09, which is unchanged from 6 months ago. He has a good stream and empties well. He is up a few times at night, but this does not cause much bother. Summary of old records: N/A    Additional History: N/A    Procedures Today: N/A    Urinalysis today:  No results found for this visit on 07/27/21. Last several PSA's:  No results found for: PSA  Last total testosterone:  No results found for: TESTOSTERONE    AUA Symptom Score (7/27/2021):   INCOMPLETE EMPTYING: How often have you had the sensation of not emptying your bladder?: Not at all  FREQUENCY: How often do you have to urinate less than every two hours?: Not at all  INTERMITTENCY: How often have you found you stopped and started again several times when you urinated?: Not at all  URGENCY: How often have you found it difficult to postpone urination?: Not at all  WEAK STREAM: How often have you had a weak urinary stream?: Not at all  STRAINING: How often have you had to strain to start  urination?: Not at all  NOCTURIA: How many times did you typically get up at night to uriniate?: 3 Times  TOTAL I-PSS SCORE[de-identified] 3  How would you feel if you were to spend the rest of your life with your urinary condition?: Mixe    Last BUN and creatinine:  No results found for: BUN  No results found for: CREATININE    Additional Lab/Culture results: none    Imaging Reviewed during this Office Visit: none  (results were independently reviewed by physician and radiology report verified)    PAST MEDICAL, FAMILY AND SOCIAL HISTORY UPDATE:  Past Medical History:   Diagnosis Date    Abdominal pain     Cancer ([facility] [address]     Hx of prostate    Gastric ulcer     GERD (gastroesophageal reflux disease)      Past Surgical History:   Procedure Laterality Date    COLONOSCOPY  2010    per pt, \"some polyps\"    UPPER GASTROINTESTINAL ENDOSCOPY  6/27/11,  11/7/2011     No family history on file. No outpatient medications have been marked as taking for the 7/27/21 encounter (Office Visit) with Rama Edmond MD.       Patient has no known allergies. Social History     Tobacco Use   Smoking Status Never Smoker   Smokeless Tobacco Never Used     (Ifpatient a smoker, smoking cessation counseling offered)    Social History     Substance and Sexual Activity   Alcohol Use Yes    Comment: occasionally       REVIEW OF SYSTEMS:  Review of Systems    Physical Exam:      Vitals:    07/27/21 1306   BP: 133/85   Pulse: 69   Temp: 97.1 °F (36.2 °C)     Body mass index is 32.6 kg/m². Patient is a 78 y.o. male in no acute distress and alert and oriented to person, place and time. Physical Exam  Constitutional: Patient in no acute distress. Neuro: Alert and oriented to person, place and time. Psych: Mood normal, affect normal  Lungs: Respiratory effort is normal  Cardiovascular: Warm & Pink  Abdomen: Soft, non-tender, non-distended with no CVA,  No flank tenderness,  Or hepatosplenomegaly   Lymphatics: No palpablelymphadenopathy. Assessment and Plan      1. Prostate cancer ([facility] [address]    2. Nocturia           Plan:          Doing well with prostate cancer on Eligard. Continue Vit D and calcium. PSA 0.09. Will give Eligard today. Restrict fluids before bed for nocturia. Return in about 3 months (around 10/27/2021) for Eligard. Prescriptions Ordered:  No orders of the defined types were placed in this encounter.     Orders Placed:  Orders Placed This Encounter   Procedures    PSA, Diagnostic     Standing Status:   Future     Standing Expiration Date:   7/27/2022           Marietta Allan MD    Agree with the ROS entered by the MA.

## 2021-07-27 NOTE — PROGRESS NOTES
After obtaining consent, and per orders of , injection of Eligard 22.5 mg given in Left arm by Kristyn Meyers MA. Patient instructed to remain in clinic for 20 minutes afterwards, and to report any adverse reaction to me immediately. Viri Whitt

## 2021-11-02 ENCOUNTER — PROCEDURE VISIT (OUTPATIENT)
Dept: UROLOGY | Age: 81
End: 2021-11-02
Payer: MEDICARE

## 2021-11-02 VITALS — HEIGHT: 66 IN | WEIGHT: 202 LBS | BODY MASS INDEX: 32.47 KG/M2

## 2021-11-02 DIAGNOSIS — R35.1 NOCTURIA: ICD-10-CM

## 2021-11-02 DIAGNOSIS — C61 PROSTATE CANCER (HCC): Primary | ICD-10-CM

## 2021-11-02 PROCEDURE — 96402 CHEMO HORMON ANTINEOPL SQ/IM: CPT | Performed by: UROLOGY

## 2021-11-02 NOTE — PROGRESS NOTES
After obtaining consent, and per orders of Dr. Yung Chen, injection of Eligard 22.5mg given in Right arm by Gabriella Kumar MA. Patient instructed to remain in clinic for 20 minutes afterwards, and to report any adverse reaction to me immediately.

## 2022-02-08 ENCOUNTER — PROCEDURE VISIT (OUTPATIENT)
Dept: UROLOGY | Age: 82
End: 2022-02-08
Payer: MEDICARE

## 2022-02-08 ENCOUNTER — TELEPHONE (OUTPATIENT)
Dept: UROLOGY | Age: 82
End: 2022-02-08

## 2022-02-08 VITALS
SYSTOLIC BLOOD PRESSURE: 121 MMHG | HEART RATE: 77 BPM | WEIGHT: 202 LBS | BODY MASS INDEX: 32.47 KG/M2 | TEMPERATURE: 96.5 F | DIASTOLIC BLOOD PRESSURE: 77 MMHG | HEIGHT: 66 IN

## 2022-02-08 DIAGNOSIS — C61 PROSTATE CANCER (HCC): Primary | ICD-10-CM

## 2022-02-08 DIAGNOSIS — R35.1 NOCTURIA: ICD-10-CM

## 2022-02-08 PROCEDURE — 96402 CHEMO HORMON ANTINEOPL SQ/IM: CPT | Performed by: UROLOGY

## 2022-02-08 PROCEDURE — 1036F TOBACCO NON-USER: CPT | Performed by: UROLOGY

## 2022-02-08 PROCEDURE — 99213 OFFICE O/P EST LOW 20 MIN: CPT | Performed by: UROLOGY

## 2022-02-08 PROCEDURE — G8417 CALC BMI ABV UP PARAM F/U: HCPCS | Performed by: UROLOGY

## 2022-02-08 PROCEDURE — 1123F ACP DISCUSS/DSCN MKR DOCD: CPT | Performed by: UROLOGY

## 2022-02-08 PROCEDURE — 4040F PNEUMOC VAC/ADMIN/RCVD: CPT | Performed by: UROLOGY

## 2022-02-08 PROCEDURE — G8427 DOCREV CUR MEDS BY ELIG CLIN: HCPCS | Performed by: UROLOGY

## 2022-02-08 PROCEDURE — G8484 FLU IMMUNIZE NO ADMIN: HCPCS | Performed by: UROLOGY

## 2022-02-08 ASSESSMENT — ENCOUNTER SYMPTOMS
EYE PAIN: 0
NAUSEA: 0
EYES NEGATIVE: 1
COUGH: 0
EYE REDNESS: 0
SHORTNESS OF BREATH: 0
WHEEZING: 0
BACK PAIN: 0
ABDOMINAL PAIN: 0
RESPIRATORY NEGATIVE: 1
DIARRHEA: 0
VOMITING: 0
GASTROINTESTINAL NEGATIVE: 1
CONSTIPATION: 0

## 2022-02-08 NOTE — TELEPHONE ENCOUNTER
Writer spoke with Liliana Stafford With [employer]. Eligard Injection for patient was approved from 02/08/22 to 08/08/22. Case # [id number].

## 2022-02-08 NOTE — PROGRESS NOTES
[address] [facility] [address]  Dept: [address] [facility] Urology Office Note - Established    Patient:  Marisabel Chen  YOB: 1940  Date: 2/8/2022    The patient is a 78 y.o. male who presents todayfor evaluation of the following problems:   Chief Complaint   Patient presents with    6 Month Follow-Up     6 month eligard       HPI  He is here in follow up for prostate cancer. He is doing well  His last Eligard was a 22.5 given three months ago. His last PSA was 0.06. No voiding complaints. No recent pain or weight loss. Summary of old records: N/A    Additional History: N/A    Procedures Today: N/A    Urinalysis today:  No results found for this visit on 02/08/22. Last several PSA's:  No results found for: PSA  Last total testosterone:  No results found for: TESTOSTERONE    AUA Symptom Score (2/8/2022):   INCOMPLETE EMPTYING: How often have you had the sensation of not emptying your bladder?: Not at all  FREQUENCY: How often do you have to urinate less than every two hours?: Not at all  INTERMITTENCY: How often have you found you stopped and started again several times when you urinated?: Not at all  URGENCY: How often have you found it difficult to postpone urination?: Not at all  WEAK STREAM: How often have you had a weak urinary stream?: Not at all  STRAINING: How often have you had to strain to start  urination?: Not at all  NOCTURIA: How many times did you typically get up at night to uriniate?: 2 Times (2 to 3 times)  TOTAL I-PSS SCORE[de-identified] 2       Last BUN and creatinine:  No results found for: BUN  No results found for: CREATININE    Additional Lab/Culture results: none    Imaging Reviewed during this Office Visit: none  (results were independently reviewed by physician and radiology report verified)    PAST MEDICAL, FAMILY AND SOCIAL HISTORY UPDATE:  Past Medical History:   Diagnosis Date    Abdominal pain     Cancer ([facility] [address]     Hx of prostate    Gastric ulcer     GERD (gastroesophageal reflux disease)      Past Surgical History:   Procedure Laterality Date    COLONOSCOPY  2010    per pt, \"some polyps\"    UPPER GASTROINTESTINAL ENDOSCOPY  6/27/11,  11/7/2011     No family history on file. Outpatient Medications Marked as Taking for the 2/8/22 encounter (Procedure visit) with Fani Stout MD   Medication Sig Dispense Refill    lisinopril-hydroCHLOROthiazide (PRINZIDE;ZESTORETIC) 20-12.5 MG per tablet TAKE 1 TABLET BY MOUTH ONCE DAILY FOR 90 DAYS      omeprazole (PRILOSEC) 20 MG delayed release capsule TAKE 1 CAPSULE BY MOUTH ONCE DAILY IN THE MORNING BEFORE BREAKFAST 90 capsule 1    valsartan (DIOVAN) 80 MG tablet       aspirin 81 MG EC tablet Take 81 mg by mouth daily.  Multiple Vitamin (MULTIVITAMIN PO) Take  by mouth daily.  Cholecalciferol (VITAMIN D) 2000 UNITS TABS Take  by mouth Daily.  cetirizine (ZYRTEC) 10 MG tablet Take 10 mg by mouth daily.  simvastatin (ZOCOR) 40 MG tablet Take 40 mg by mouth nightly. Patient has no known allergies. Social History     Tobacco Use   Smoking Status Never Smoker   Smokeless Tobacco Never Used     (Ifpatient a smoker, smoking cessation counseling offered)    Social History     Substance and Sexual Activity   Alcohol Use Yes    Comment: occasionally       REVIEW OF SYSTEMS:  Review of Systems    Physical Exam:      Vitals:    02/08/22 1101   BP: 121/77   Pulse: 77   Temp: 96.5 °F (35.8 °C)     Body mass index is 32.6 kg/m². Patient is a 78 y.o. male in no acute distress and alert and oriented to person, place and time. Physical Exam  Constitutional: Patient in no acute distress. Neuro: Alert and oriented to person, place and time.   Psych: Mood normal, affect normal  Lungs: Respiratory effort is normal  Cardiovascular: Warm & Pink  Abdomen: Soft, non-tender, non-distended with no CVA,  No flank tenderness,  Or hepatosplenomegaly   Lymphatics: No palpablelymphadenopathy. Bladder non-tender and not distended. Musculoskeletal: Normal gait and station      Assessment and Plan      1. Prostate cancer ([address]    2. Nocturia           Plan:          He is doing well with Eligard. PSA is 0.06  6 month injection planned for today. F/U in 6 months with a PSA. Return in about 6 months (around 8/8/2022) for Labs. Prescriptions Ordered:  No orders of the defined types were placed in this encounter. Orders Placed:  Orders Placed This Encounter   Procedures    PSA, Diagnostic     Standing Status:   Future     Standing Expiration Date:   2/8/2023           Enrico Perdomo MD    Agree with the ROS entered by the MA.

## 2022-02-08 NOTE — PROGRESS NOTES
After obtaining consent, and per orders of Dr. Bry Blevins, injection of Eligard 45mg given in left arm by Leida Ng MA. Patient instructed to remain in clinic for 20 minutes afterwards, and to report any adverse reaction to me immediately.

## 2022-08-23 ENCOUNTER — OFFICE VISIT (OUTPATIENT)
Dept: UROLOGY | Age: 82
End: 2022-08-23
Payer: MEDICARE

## 2022-08-23 VITALS — WEIGHT: 202 LBS | HEIGHT: 66 IN | BODY MASS INDEX: 32.47 KG/M2 | TEMPERATURE: 97.6 F

## 2022-08-23 DIAGNOSIS — C61 PROSTATE CANCER (HCC): Primary | ICD-10-CM

## 2022-08-23 DIAGNOSIS — R35.1 NOCTURIA: ICD-10-CM

## 2022-08-23 PROCEDURE — 1123F ACP DISCUSS/DSCN MKR DOCD: CPT | Performed by: UROLOGY

## 2022-08-23 PROCEDURE — G8427 DOCREV CUR MEDS BY ELIG CLIN: HCPCS | Performed by: UROLOGY

## 2022-08-23 PROCEDURE — G8417 CALC BMI ABV UP PARAM F/U: HCPCS | Performed by: UROLOGY

## 2022-08-23 PROCEDURE — 99212 OFFICE O/P EST SF 10 MIN: CPT | Performed by: UROLOGY

## 2022-08-23 PROCEDURE — 96402 CHEMO HORMON ANTINEOPL SQ/IM: CPT | Performed by: UROLOGY

## 2022-08-23 PROCEDURE — 1036F TOBACCO NON-USER: CPT | Performed by: UROLOGY

## 2022-08-23 ASSESSMENT — ENCOUNTER SYMPTOMS
CONSTIPATION: 0
EYE PAIN: 0
ABDOMINAL PAIN: 0
RESPIRATORY NEGATIVE: 1
NAUSEA: 0
DIARRHEA: 0
SHORTNESS OF BREATH: 0
COUGH: 0
EYE REDNESS: 0
BACK PAIN: 0
EYES NEGATIVE: 1
WHEEZING: 0
GASTROINTESTINAL NEGATIVE: 1
VOMITING: 0

## 2022-08-23 NOTE — PROGRESS NOTES
After obtaining consent, and per orders of Dr. Kylee Thompson, injection of Eligard given in Right arm by Roberto Cummins MA. Patient instructed to remain in clinic for 20 minutes afterwards, and to report any adverse reaction to me immediately.

## 2022-08-23 NOTE — PROGRESS NOTES
Review of Systems   Constitutional: Negative. Negative for appetite change, chills and fatigue. Eyes: Negative. Negative for pain, redness and visual disturbance. Respiratory: Negative. Negative for cough, shortness of breath and wheezing. Cardiovascular:  Negative for chest pain and leg swelling. Gastrointestinal: Negative. Negative for abdominal pain, constipation, diarrhea, nausea and vomiting. Genitourinary: Negative. Negative for difficulty urinating, dysuria, flank pain, frequency, hematuria and urgency. Musculoskeletal: Negative. Negative for back pain, joint swelling and myalgias. Skin: Negative. Negative for rash and wound. Neurological: Negative. Negative for dizziness, weakness and numbness. Hematological: Negative. Does not bruise/bleed easily.

## 2023-02-28 ENCOUNTER — TELEPHONE (OUTPATIENT)
Dept: UROLOGY | Age: 83
End: 2023-02-28

## 2023-02-28 ENCOUNTER — OFFICE VISIT (OUTPATIENT)
Dept: UROLOGY | Age: 83
End: 2023-02-28
Payer: MEDICARE

## 2023-02-28 VITALS
WEIGHT: 202 LBS | BODY MASS INDEX: 32.47 KG/M2 | HEIGHT: 66 IN | DIASTOLIC BLOOD PRESSURE: 87 MMHG | HEART RATE: 70 BPM | SYSTOLIC BLOOD PRESSURE: 116 MMHG

## 2023-02-28 DIAGNOSIS — R35.1 NOCTURIA: ICD-10-CM

## 2023-02-28 DIAGNOSIS — C61 PROSTATE CANCER (HCC): Primary | ICD-10-CM

## 2023-02-28 PROCEDURE — G8417 CALC BMI ABV UP PARAM F/U: HCPCS | Performed by: UROLOGY

## 2023-02-28 PROCEDURE — 99212 OFFICE O/P EST SF 10 MIN: CPT | Performed by: UROLOGY

## 2023-02-28 PROCEDURE — 1123F ACP DISCUSS/DSCN MKR DOCD: CPT | Performed by: UROLOGY

## 2023-02-28 PROCEDURE — 1036F TOBACCO NON-USER: CPT | Performed by: UROLOGY

## 2023-02-28 PROCEDURE — G8428 CUR MEDS NOT DOCUMENT: HCPCS | Performed by: UROLOGY

## 2023-02-28 PROCEDURE — G8484 FLU IMMUNIZE NO ADMIN: HCPCS | Performed by: UROLOGY

## 2023-02-28 ASSESSMENT — ENCOUNTER SYMPTOMS
WHEEZING: 0
BACK PAIN: 0
EYE PAIN: 0
VOMITING: 0
SHORTNESS OF BREATH: 0
GASTROINTESTINAL NEGATIVE: 1
RESPIRATORY NEGATIVE: 1
CONSTIPATION: 0
ABDOMINAL PAIN: 0
COUGH: 0
EYES NEGATIVE: 1
EYE REDNESS: 0
DIARRHEA: 0
NAUSEA: 0

## 2023-02-28 NOTE — PROGRESS NOTES
[address] [address]  Dept: [address] [facility] Urology Office Note - Established    Patient:  Hemant Cohen  YOB: 1940  Date: 2/28/2023    The patient is a 78 y.o. male who presents todayfor evaluation of the following problems:   Chief Complaint   Patient presents with    Prostate Cancer     6 month with PSA and Eligard- [id number]       HPI  He is here in follow up for prostate cancer. He has been on ADT for many years. PSA is 0.07  He is due for an Eligard today. Summary of old records: N/A    Additional History: N/A    Procedures Today: N/A    Urinalysis today:  No results found for this visit on 02/28/23. Last several PSA's:  No results found for: PSA  Last total testosterone:  No results found for: TESTOSTERONE    AUA Symptom Score (2/28/2023): Last BUN and creatinine:  No results found for: BUN  No results found for: CREATININE    Additional Lab/Culture results: none    Imaging Reviewed during this Office Visit: none  (results were independently reviewed by physician and radiology report verified)    PAST MEDICAL, FAMILY AND SOCIAL HISTORY UPDATE:  Past Medical History:   Diagnosis Date    Abdominal pain     Cancer ([facility] [address]     Hx of prostate    Gastric ulcer     GERD (gastroesophageal reflux disease)      Past Surgical History:   Procedure Laterality Date    COLONOSCOPY  2010    per pt, \"some polyps\"    UPPER GASTROINTESTINAL ENDOSCOPY  6/27/11,  11/7/2011     No family history on file.   Outpatient Medications Marked as Taking for the 2/28/23 encounter (Office Visit) with Jenn Simmons MD   Medication Sig Dispense Refill    lisinopril-hydroCHLOROthiazide (PRINZIDE;ZESTORETIC) 20-12.5 MG per tablet TAKE 1 TABLET BY MOUTH ONCE DAILY FOR 90 DAYS      omeprazole (PRILOSEC) 20 MG delayed release capsule TAKE 1 CAPSULE BY MOUTH ONCE DAILY IN THE MORNING BEFORE BREAKFAST 90 capsule 1    valsartan (DIOVAN) 80 MG tablet       aspirin 81 MG EC tablet Take 81 mg by mouth daily.        Multiple Vitamin (MULTIVITAMIN PO) Take  by mouth daily.        Cholecalciferol (VITAMIN D) 2000 UNITS TABS Take  by mouth Daily.        cetirizine (ZYRTEC) 10 MG tablet Take 10 mg by mouth daily.        simvastatin (ZOCOR) 40 MG tablet Take 40 mg by mouth nightly.           Patient has no known allergies.  Social History     Tobacco Use   Smoking Status Never   Smokeless Tobacco Never     (Ifpatient a smoker, smoking cessation counseling offered)    Social History     Substance and Sexual Activity   Alcohol Use Yes    Comment: occasionally       REVIEW OF SYSTEMS:  Review of Systems    Physical Exam:      Vitals:    02/28/23 1345   BP: 116/87   Pulse: 70     Body mass index is 32.6 kg/m².  Patient is a 82 y.o. male in no acute distress and alert and oriented to person, place and time.  Physical Exam  Constitutional: Patient in no acute distress.  Neuro: Alert and oriented to person, place and time.  Psych: Mood normal, affect normal  Skin: No rash noted    Assessment and Plan      1. Prostate cancer (HCC)    2. Nocturia           Plan:         He is doing well.   Last Eligard was 6 months ago.  Will hold on Eligard today.   F/U in 6 months with an Eligard and a PSA.   Return in about 6 months (around 8/28/2023) for Labs.    Prescriptions Ordered:  No orders of the defined types were placed in this encounter.    Orders Placed:  Orders Placed This Encounter   Procedures    PSA, Diagnostic     Standing Status:   Future     Standing Expiration Date:   2/28/2024             Cral Pinedo MD    Agree with the ROS entered by the MA.

## 2023-08-28 LAB — PROSTATE SPECIFIC ANTIGEN: 0.09 NG/ML

## 2023-09-11 DIAGNOSIS — C61 PROSTATE CANCER (HCC): ICD-10-CM

## 2023-09-12 ENCOUNTER — OFFICE VISIT (OUTPATIENT)
Dept: UROLOGY | Age: 83
End: 2023-09-12
Payer: MEDICARE

## 2023-09-12 VITALS
SYSTOLIC BLOOD PRESSURE: 152 MMHG | BODY MASS INDEX: 32.47 KG/M2 | TEMPERATURE: 96.9 F | HEART RATE: 68 BPM | DIASTOLIC BLOOD PRESSURE: 83 MMHG | WEIGHT: 202 LBS | HEIGHT: 66 IN

## 2023-09-12 DIAGNOSIS — C61 PROSTATE CANCER (HCC): Primary | ICD-10-CM

## 2023-09-12 PROCEDURE — 1036F TOBACCO NON-USER: CPT | Performed by: UROLOGY

## 2023-09-12 PROCEDURE — G8427 DOCREV CUR MEDS BY ELIG CLIN: HCPCS | Performed by: UROLOGY

## 2023-09-12 PROCEDURE — 99212 OFFICE O/P EST SF 10 MIN: CPT | Performed by: UROLOGY

## 2023-09-12 PROCEDURE — G8417 CALC BMI ABV UP PARAM F/U: HCPCS | Performed by: UROLOGY

## 2023-09-12 PROCEDURE — 96402 CHEMO HORMON ANTINEOPL SQ/IM: CPT | Performed by: UROLOGY

## 2023-09-12 PROCEDURE — 1123F ACP DISCUSS/DSCN MKR DOCD: CPT | Performed by: UROLOGY

## 2023-09-12 ASSESSMENT — ENCOUNTER SYMPTOMS
EYE PAIN: 0
GASTROINTESTINAL NEGATIVE: 1
DIARRHEA: 0
SHORTNESS OF BREATH: 0
NAUSEA: 0
CONSTIPATION: 0
EYE REDNESS: 0
BACK PAIN: 0
EYES NEGATIVE: 1
WHEEZING: 0
COUGH: 0
RESPIRATORY NEGATIVE: 1
ABDOMINAL PAIN: 0
VOMITING: 0

## 2023-09-12 NOTE — PROGRESS NOTES
[address]  [address] [facility] [address]  Dept: [address] [place] [address] Urology Office Note - Established    Patient:  Natasha John  YOB: 1940  Date: 9/12/2023    The patient is a 78 y.o. male who presents todayfor evaluation of the following problems:   Chief Complaint   Patient presents with    Prostate Cancer     6 month with PSA (Likely Eligard)       HPI  He is here in follow up for prostate cancer. He has been on Eligard for years. He is doing well. PSA is 0.09. He takes Vit D and calcium    Summary of old records: N/A    Additional History: N/A    Procedures Today: N/A    Urinalysis today:  No results found for this visit on 09/12/23. Last several PSA's:  Lab Results   Component Value Date    PSA 0.09 08/28/2023     Last total testosterone:  No results found for: \"TESTOSTERONE\"    AUA Symptom Score (9/12/2023): Last BUN and creatinine:  No results found for: \"BUN\"  No results found for: \"CREATININE\"    Additional Lab/Culture results: none    Imaging Reviewed during this Office Visit: none  (results were independently reviewed by physician and radiology report verified)    PAST MEDICAL, FAMILY AND SOCIAL HISTORY UPDATE:  Past Medical History:   Diagnosis Date    Abdominal pain     Cancer ([address])     Hx of prostate    Gastric ulcer     GERD (gastroesophageal reflux disease)      Past Surgical History:   Procedure Laterality Date    COLONOSCOPY  2010    per pt, \"some polyps\"    UPPER GASTROINTESTINAL ENDOSCOPY  6/27/11,  11/7/2011     No family history on file.   Outpatient Medications Marked as Taking for the 9/12/23 encounter (Office Visit) with Oziel Peña MD   Medication Sig Dispense Refill    lisinopril-hydroCHLOROthiazide (PRINZIDE;ZESTORETIC) 20-12.5 MG per tablet TAKE 1 TABLET BY MOUTH ONCE DAILY FOR 90 DAYS      omeprazole (PRILOSEC) 20 MG delayed

## 2023-09-12 NOTE — PROGRESS NOTES
After obtaining written and verbal consent, and per orders of Dr. Anant Villareal administered in Left arm by Nena Vaz RN. Patient was instructed to remain in clinic for 20 mins following injection and report any adverse reactions to me immediately. He tolerated the injection without any complications. We reviewed that the side effects include hot flashes, fatigue, breast enlargement, diminished libido, ED and long term development of osteoporosis. The patient was told he will encouraged to take supplemental Calcium and Vitamin D to prevent the latter. Patient refused injection in abd. He requested the arm.

## 2024-03-26 ENCOUNTER — OFFICE VISIT (OUTPATIENT)
Dept: UROLOGY | Age: 84
End: 2024-03-26
Payer: MEDICARE

## 2024-03-26 VITALS
TEMPERATURE: 97.9 F | BODY MASS INDEX: 31.02 KG/M2 | SYSTOLIC BLOOD PRESSURE: 124 MMHG | HEIGHT: 66 IN | WEIGHT: 193 LBS | HEART RATE: 84 BPM | OXYGEN SATURATION: 96 % | DIASTOLIC BLOOD PRESSURE: 82 MMHG

## 2024-03-26 DIAGNOSIS — C61 PROSTATE CANCER (HCC): Primary | ICD-10-CM

## 2024-03-26 PROCEDURE — 96402 CHEMO HORMON ANTINEOPL SQ/IM: CPT | Performed by: UROLOGY

## 2024-03-26 PROCEDURE — 1036F TOBACCO NON-USER: CPT | Performed by: UROLOGY

## 2024-03-26 PROCEDURE — G8427 DOCREV CUR MEDS BY ELIG CLIN: HCPCS | Performed by: UROLOGY

## 2024-03-26 PROCEDURE — G8417 CALC BMI ABV UP PARAM F/U: HCPCS | Performed by: UROLOGY

## 2024-03-26 PROCEDURE — 1123F ACP DISCUSS/DSCN MKR DOCD: CPT | Performed by: UROLOGY

## 2024-03-26 PROCEDURE — G8484 FLU IMMUNIZE NO ADMIN: HCPCS | Performed by: UROLOGY

## 2024-03-26 PROCEDURE — 99212 OFFICE O/P EST SF 10 MIN: CPT | Performed by: UROLOGY

## 2024-03-26 RX ORDER — ALBUTEROL SULFATE 90 UG/1
AEROSOL, METERED RESPIRATORY (INHALATION)
COMMUNITY
Start: 2019-12-23

## 2024-03-26 RX ORDER — IRBESARTAN 150 MG/1
150 TABLET ORAL
COMMUNITY
Start: 2018-08-09 | End: 2018-09-08

## 2024-03-26 RX ORDER — TADALAFIL 20 MG/1
TABLET ORAL
COMMUNITY
Start: 2019-01-10

## 2024-03-26 RX ORDER — LEVOCETIRIZINE DIHYDROCHLORIDE 5 MG/1
TABLET, FILM COATED ORAL
COMMUNITY
Start: 2019-12-23

## 2024-03-26 RX ORDER — LORATADINE 10 MG/1
10 TABLET ORAL
COMMUNITY
Start: 2018-12-27 | End: 2019-01-26

## 2024-03-26 RX ORDER — SILDENAFIL 50 MG/1
TABLET, FILM COATED ORAL
COMMUNITY
Start: 2016-12-20

## 2024-03-26 RX ORDER — IBUPROFEN 600 MG/1
TABLET ORAL
COMMUNITY
Start: 2018-03-06

## 2024-03-26 ASSESSMENT — ENCOUNTER SYMPTOMS
VOMITING: 0
RESPIRATORY NEGATIVE: 1
EYE PAIN: 0
NAUSEA: 0
EYE REDNESS: 0
ABDOMINAL PAIN: 0
ALLERGIC/IMMUNOLOGIC NEGATIVE: 1
SHORTNESS OF BREATH: 0
WHEEZING: 0
BACK PAIN: 0
COUGH: 0
COLOR CHANGE: 0
EYES NEGATIVE: 1
GASTROINTESTINAL NEGATIVE: 1

## 2024-03-26 NOTE — PROGRESS NOTES
After obtaining written and verbal consent, and per orders of Dr. Pinedo, Eligard 45mg administered in Right arm of abdomen by Estefania Fang RN. Patient was instructed to remain in clinic for 20 mins following injection and report any adverse reactions to me immediately. He tolerated the injection without any complications. We reviewed that the side effects include hot flashes, fatigue, breast enlargement, diminished libido, ED and long term development of osteoporosis.  The patient was told he will encouraged to take supplemental Calcium and Vitamin D to prevent the latter.

## 2024-03-26 NOTE — PROGRESS NOTES
[facility]  [address]  [facility] [address]  Dept: [phone]    [facility] Urology Office Note - Established    Patient:  Gustabo Ortiz  YOB: 1940  Date: 3/26/2024    The patient is a 83 y.o. male who presents todayfor evaluation of the following problems:   Chief Complaint   Patient presents with    Prostate Cancer       HPI  He is here in follow up for prostate cancer.   He has been on ADT and is doing well.   His last Eligard was 6 months ago.   Most recent PSA is 0.18.     Summary of old records: N/A    Additional History: N/A    Procedures Today: N/A    Urinalysis today:  No results found for this visit on 03/26/24.  Last several PSA's:  Lab Results   Component Value Date    PSA 0.09 08/28/2023     Last total testosterone:  No results found for: \"TESTOSTERONE\"    AUA Symptom Score (3/26/2024):                               Last BUN and creatinine:  Lab Results   Component Value Date    BUN 20 09/22/2023     Lab Results   Component Value Date    CREATININE 1.65 (H) 09/22/2023       Additional Lab/Culture results: none    Imaging Reviewed during this Office Visit: none  (results were independently reviewed by physician and radiology report verified)    PAST MEDICAL, FAMILY AND SOCIAL HISTORY UPDATE:  Past Medical History:   Diagnosis Date    Abdominal pain     Cancer (HCC)     Hx of prostate    Gastric ulcer     GERD (gastroesophageal reflux disease)      Past Surgical History:   Procedure Laterality Date    COLONOSCOPY  2010    per pt, \"some polyps\"    UPPER GASTROINTESTINAL ENDOSCOPY  6/27/11,  11/7/2011     No family history on file.  Outpatient Medications Marked as Taking for the 3/26/24 encounter (Office Visit) with Carl Pinedo MD   Medication Sig Dispense Refill    tadalafil (CIALIS) 20 MG tablet 1 tablet Orally Once a day/ PRN for 30 day(s)      sildenafil (VIAGRA) 50 MG tablet 1 tablet as

## 2024-03-26 NOTE — PROGRESS NOTES
Review of Systems   Constitutional: Negative.  Negative for appetite change, chills and fever.   HENT: Negative.     Eyes: Negative.  Negative for pain, redness and visual disturbance.   Respiratory: Negative.  Negative for cough, shortness of breath and wheezing.    Cardiovascular: Negative.  Negative for chest pain and leg swelling.   Gastrointestinal: Negative.  Negative for abdominal pain, nausea and vomiting.   Endocrine: Negative.    Genitourinary: Negative.  Negative for difficulty urinating, dysuria, flank pain, frequency, hematuria, testicular pain and urgency.   Musculoskeletal: Negative.  Negative for back pain, joint swelling and myalgias.   Skin: Negative.  Negative for color change, rash and wound.   Allergic/Immunologic: Negative.    Neurological: Negative.  Negative for dizziness, tremors, weakness, numbness and headaches.   Hematological: Negative.  Negative for adenopathy. Does not bruise/bleed easily.   Psychiatric/Behavioral: Negative.

## 2024-09-17 DIAGNOSIS — C61 PROSTATE CANCER (HCC): Primary | ICD-10-CM

## 2024-09-17 LAB — PROSTATE SPECIFIC ANTIGEN: 0.12 NG/ML

## 2024-09-24 ENCOUNTER — OFFICE VISIT (OUTPATIENT)
Dept: UROLOGY | Age: 84
End: 2024-09-24
Payer: MEDICARE

## 2024-09-24 VITALS
DIASTOLIC BLOOD PRESSURE: 66 MMHG | SYSTOLIC BLOOD PRESSURE: 121 MMHG | BODY MASS INDEX: 31.64 KG/M2 | TEMPERATURE: 97.2 F | WEIGHT: 196 LBS | HEART RATE: 80 BPM

## 2024-09-24 DIAGNOSIS — C61 PROSTATE CANCER (HCC): Primary | ICD-10-CM

## 2024-09-24 DIAGNOSIS — C61 PROSTATE CANCER (HCC): ICD-10-CM

## 2024-09-24 PROCEDURE — 1036F TOBACCO NON-USER: CPT | Performed by: UROLOGY

## 2024-09-24 PROCEDURE — G8417 CALC BMI ABV UP PARAM F/U: HCPCS | Performed by: UROLOGY

## 2024-09-24 PROCEDURE — 99212 OFFICE O/P EST SF 10 MIN: CPT | Performed by: UROLOGY

## 2024-09-24 PROCEDURE — G8427 DOCREV CUR MEDS BY ELIG CLIN: HCPCS | Performed by: UROLOGY

## 2024-09-24 PROCEDURE — 96402 CHEMO HORMON ANTINEOPL SQ/IM: CPT | Performed by: UROLOGY

## 2024-09-24 PROCEDURE — 1123F ACP DISCUSS/DSCN MKR DOCD: CPT | Performed by: UROLOGY

## 2024-09-24 ASSESSMENT — ENCOUNTER SYMPTOMS
GASTROINTESTINAL NEGATIVE: 1
EYE REDNESS: 0
SHORTNESS OF BREATH: 0
EYE PAIN: 0
ABDOMINAL PAIN: 0
WHEEZING: 0
COLOR CHANGE: 0
RESPIRATORY NEGATIVE: 1
VOMITING: 0
ALLERGIC/IMMUNOLOGIC NEGATIVE: 1
BACK PAIN: 0
EYES NEGATIVE: 1
COUGH: 0
NAUSEA: 0

## 2024-09-25 ENCOUNTER — TELEPHONE (OUTPATIENT)
Dept: UROLOGY | Age: 84
End: 2024-09-25

## 2024-10-01 ENCOUNTER — OFFICE VISIT (OUTPATIENT)
Dept: PODIATRY | Age: 84
End: 2024-10-01
Payer: MEDICARE

## 2024-10-01 VITALS — BODY MASS INDEX: 31.5 KG/M2 | HEIGHT: 66 IN | WEIGHT: 196 LBS

## 2024-10-01 DIAGNOSIS — M79.674 PAIN OF TOES OF BOTH FEET: ICD-10-CM

## 2024-10-01 DIAGNOSIS — B35.1 ONYCHOMYCOSIS OF TOENAIL: Primary | ICD-10-CM

## 2024-10-01 DIAGNOSIS — M79.675 PAIN OF TOES OF BOTH FEET: ICD-10-CM

## 2024-10-01 PROCEDURE — 1123F ACP DISCUSS/DSCN MKR DOCD: CPT | Performed by: PODIATRIST

## 2024-10-01 PROCEDURE — 11721 DEBRIDE NAIL 6 OR MORE: CPT | Performed by: PODIATRIST

## 2024-10-01 PROCEDURE — G8427 DOCREV CUR MEDS BY ELIG CLIN: HCPCS | Performed by: PODIATRIST

## 2024-10-01 PROCEDURE — G8484 FLU IMMUNIZE NO ADMIN: HCPCS | Performed by: PODIATRIST

## 2024-10-01 PROCEDURE — 99203 OFFICE O/P NEW LOW 30 MIN: CPT | Performed by: PODIATRIST

## 2024-10-01 PROCEDURE — G8417 CALC BMI ABV UP PARAM F/U: HCPCS | Performed by: PODIATRIST

## 2024-10-01 PROCEDURE — 1036F TOBACCO NON-USER: CPT | Performed by: PODIATRIST

## 2024-10-01 ASSESSMENT — ENCOUNTER SYMPTOMS
NAUSEA: 0
SHORTNESS OF BREATH: 0
BACK PAIN: 0
COLOR CHANGE: 0
DIARRHEA: 0

## 2024-10-01 NOTE — PROGRESS NOTES
Gustabo Ortiz is a 84 y.o. male who presents to the office today with chief complaint of thick pain for toenails to both feet.  Chief Complaint   Patient presents with    Nail Problem     Nail trim/last saw Dr.Robert Perez 9/17/2024   Symptoms began greater than 1 year(s) ago. Patient denies injury to the feet. Patient states that his toenails are painful with shoe gear and ambulation. Pain is rated 5 out of 10 at it's worst and is described as intermittent. Treatments prior to today's visit include: None.      No Known Allergies    Past Medical History:   Diagnosis Date    Abdominal pain     Cancer (HCC)     Hx of prostate    Gastric ulcer     GERD (gastroesophageal reflux disease)        Prior to Admission medications    Medication Sig Start Date End Date Taking? Authorizing Provider   lisinopril-hydroCHLOROthiazide (PRINZIDE;ZESTORETIC) 20-12.5 MG per tablet TAKE 1 TABLET BY MOUTH ONCE DAILY FOR 90 DAYS 12/30/20  Yes Cornel Grier MD   aspirin 81 MG EC tablet Take 1 tablet by mouth daily   Yes Cornel Grier MD   Multiple Vitamin (MULTIVITAMIN PO) Take  by mouth daily.     Yes Cornel Grier MD   Cholecalciferol (VITAMIN D) 2000 UNITS TABS Take  by mouth Daily.     Yes Cornel Grier MD   cetirizine (ZYRTEC) 10 MG tablet Take 1 tablet by mouth daily   Yes Cornel Grier MD   simvastatin (ZOCOR) 40 MG tablet Take 1 tablet by mouth nightly   Yes Cornel Grier MD   irbesartan (AVAPRO) 150 MG tablet Take 1 tablet by mouth 8/9/18 9/8/18  Cornel Grier MD   valsartan (DIOVAN) 80 MG tablet  6/15/18   ProviderCornel MD       Past Surgical History:   Procedure Laterality Date    COLONOSCOPY  2010    per pt, \"some polyps\"    UPPER GASTROINTESTINAL ENDOSCOPY  6/27/11,  11/7/2011       No family history on file.    Social History     Tobacco Use    Smoking status: Never    Smokeless tobacco: Never   Substance Use Topics    Alcohol use: Yes     Comment:

## 2025-01-07 ENCOUNTER — OFFICE VISIT (OUTPATIENT)
Dept: PODIATRY | Age: 85
End: 2025-01-07
Payer: MEDICARE

## 2025-01-07 VITALS — BODY MASS INDEX: 29.73 KG/M2 | WEIGHT: 185 LBS | HEIGHT: 66 IN

## 2025-01-07 DIAGNOSIS — B35.1 ONYCHOMYCOSIS OF TOENAIL: Primary | ICD-10-CM

## 2025-01-07 DIAGNOSIS — M79.674 PAIN OF TOES OF BOTH FEET: ICD-10-CM

## 2025-01-07 DIAGNOSIS — M79.675 PAIN OF TOES OF BOTH FEET: ICD-10-CM

## 2025-01-07 PROCEDURE — 99999 PR OFFICE/OUTPT VISIT,PROCEDURE ONLY: CPT | Performed by: PODIATRIST

## 2025-01-07 PROCEDURE — 11721 DEBRIDE NAIL 6 OR MORE: CPT | Performed by: PODIATRIST

## 2025-01-07 ASSESSMENT — ENCOUNTER SYMPTOMS
SHORTNESS OF BREATH: 0
NAUSEA: 0
COLOR CHANGE: 0
DIARRHEA: 0
BACK PAIN: 0

## 2025-01-07 NOTE — PROGRESS NOTES
SUBJECTIVE: Gustabo Ortiz is a 84 y.o. male who returns to the office with chief complaint of painful fungal toenails. Patient relates toe nails are thickened/difficult to trim as well as painful with ambulation and with shoe gear.   Chief Complaint   Patient presents with    Nail Problem     B/l nail trim, last seen Daniel Perez 9/17/24     Review of Systems   Constitutional:  Negative for activity change, appetite change, chills, diaphoresis, fatigue and fever.   Respiratory:  Negative for shortness of breath.    Cardiovascular:  Negative for leg swelling.   Gastrointestinal:  Negative for diarrhea and nausea.   Endocrine: Negative for cold intolerance, heat intolerance and polyuria.   Musculoskeletal:  Positive for arthralgias. Negative for back pain, gait problem, joint swelling and myalgias.   Skin:  Negative for color change, pallor, rash and wound.   Allergic/Immunologic: Negative for environmental allergies and food allergies.   Neurological:  Negative for dizziness, weakness, light-headedness and numbness.   Hematological:  Does not bruise/bleed easily.   Psychiatric/Behavioral:  Negative for behavioral problems, confusion and self-injury. The patient is not nervous/anxious.      OBJECTIVE: Clinical evaluation of patient reveals nails 1,2,3,4,5 of the right foot and nails 1,2,3,4,5 of the left foot to present with thickness, elongation, discoloration, brittleness, and subungual debris. There was pain with palpation and debridement of the toenails of the bilateral feet. No open lesions noted to either foot today.     Class A Findings (1 needed)   [] Non-traumatic amputation of foot or integral skeleton portion thereof.   [] Q7.      Class B Findings (2 needed)   1. [] Absent posterior tibial pulse   2. [] Absent dorsalis pedis pulse   3. [] Advanced trophic changes; three of the following are required:   ·         [] hair growth (decrease or absence)   ·         [] nail changes (thickening)   ·

## 2025-02-11 ENCOUNTER — APPOINTMENT (OUTPATIENT)
Dept: CT IMAGING | Age: 85
End: 2025-02-11
Payer: MEDICARE

## 2025-02-11 ENCOUNTER — HOSPITAL ENCOUNTER (EMERGENCY)
Age: 85
Discharge: HOME | End: 2025-02-12
Attending: EMERGENCY MEDICINE
Payer: MEDICARE

## 2025-02-11 VITALS
TEMPERATURE: 97 F | OXYGEN SATURATION: 95 % | HEIGHT: 66 IN | RESPIRATION RATE: 19 BRPM | HEART RATE: 82 BPM | SYSTOLIC BLOOD PRESSURE: 163 MMHG | BODY MASS INDEX: 29.73 KG/M2 | DIASTOLIC BLOOD PRESSURE: 108 MMHG | WEIGHT: 185 LBS

## 2025-02-11 DIAGNOSIS — S32.009A CLOSED FRACTURE OF TRANSVERSE PROCESS OF LUMBAR VERTEBRA, INITIAL ENCOUNTER (HCC): Primary | ICD-10-CM
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
## 2025-02-11 PROCEDURE — 72131 CT LUMBAR SPINE W/O DYE: CPT

## 2025-02-11 PROCEDURE — 99284 EMERGENCY DEPT VISIT MOD MDM: CPT

## 2025-02-11 PROCEDURE — 6370000000 HC RX 637 (ALT 250 FOR IP)

## 2025-02-11 PROCEDURE — 72125 CT NECK SPINE W/O DYE: CPT

## 2025-02-11 PROCEDURE — 70450 CT HEAD/BRAIN W/O DYE: CPT

## 2025-02-11 RX ORDER — ACETAMINOPHEN 500 MG
1000 TABLET ORAL ONCE
Status: COMPLETED | OUTPATIENT
Start: 2025-02-11 | End: 2025-02-11

## 2025-02-11 RX ADMIN — ACETAMINOPHEN 1000 MG: 500 TABLET, FILM COATED ORAL at 21:36

## 2025-02-11 ASSESSMENT — PAIN - FUNCTIONAL ASSESSMENT: PAIN_FUNCTIONAL_ASSESSMENT: NONE - DENIES PAIN

## 2025-02-11 ASSESSMENT — LIFESTYLE VARIABLES
HOW MANY STANDARD DRINKS CONTAINING ALCOHOL DO YOU HAVE ON A TYPICAL DAY: PATIENT DOES NOT DRINK
HOW OFTEN DO YOU HAVE A DRINK CONTAINING ALCOHOL: NEVER

## 2025-02-11 ASSESSMENT — PAIN SCALES - GENERAL: PAINLEVEL_OUTOF10: 2

## 2025-02-11 ASSESSMENT — PAIN DESCRIPTION - LOCATION: LOCATION: HEAD

## 2025-02-12 ENCOUNTER — TELEPHONE (OUTPATIENT)
Dept: ORTHOPEDIC SURGERY | Age: 85
End: 2025-02-12

## 2025-02-12 NOTE — TELEPHONE ENCOUNTER
Kesha from [facility] called to schedule an appointment for this patient. He had a fall yesterday and went to the ED. The CT shows a closed fracture of the transverse process of the L3. Your soonest available appointment is March 18. Could you please review CT and advise if the patient needs to come in sooner and what day to schedule them.

## 2025-02-12 NOTE — ED NOTES
Mode of arrival (squad #, walk in, police, etc) : EMS        Chief complaint(s): Fall        Arrival Note (brief scenario, treatment PTA, etc).: Patient tripped on the stairs, fell backward and landed on his butt and hitting the back of his bed in the concrete floor. Patient denies LOC, not on blood thinners. Patient sustained abrasion in the occipital area, mild bleeding is noted.        C= \"Have you ever felt that you should Cut down on your drinking?\"  No  A= \"Have people Annoyed you by criticizing your drinking?\"  No  G= \"Have you ever felt bad or Guilty about your drinking?\"  No  E= \"Have you ever had a drink as an Eye-opener first thing in the morning to steady your nerves or to help a hangover?\"  No      Deferred []      Reason for deferring: N/A    *If yes to two or more: probable alcohol abuse.*

## 2025-02-21 ENCOUNTER — HOSPITAL ENCOUNTER (INPATIENT)
Age: 85
LOS: 3 days | Discharge: HOME | DRG: 378 | End: 2025-02-24
Attending: EMERGENCY MEDICINE
Payer: MEDICARE

## 2025-02-21 DIAGNOSIS — K92.1 MELENA: ICD-10-CM
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
## 2025-02-21 DIAGNOSIS — I48.91 ATRIAL FIBRILLATION, UNSPECIFIED TYPE (HCC): ICD-10-CM

## 2025-02-21 DIAGNOSIS — R09.89 BRUIT: ICD-10-CM

## 2025-02-21 DIAGNOSIS — D64.9 ANEMIA, UNSPECIFIED TYPE: ICD-10-CM

## 2025-02-21 DIAGNOSIS — K92.2 UPPER GI BLEED: Primary | ICD-10-CM

## 2025-02-21 LAB
ABO + RH BLD: NORMAL
ALBUMIN SERPL-MCNC: 3.8 G/DL (ref 3.5–5.2)
ALP SERPL-CCNC: 60 U/L (ref 40–129)
ALT SERPL-CCNC: 17 U/L (ref 10–50)
ANION GAP SERPL CALCULATED.3IONS-SCNC: 13 MMOL/L (ref 9–16)
ARM BAND NUMBER: NORMAL
AST SERPL-CCNC: 19 U/L (ref 10–50)
BACTERIA URNS QL MICRO: ABNORMAL
BASOPHILS # BLD: 0 K/UL (ref 0–0.2)
BASOPHILS NFR BLD: 0 % (ref 0–2)
BILIRUB SERPL-MCNC: <0.2 MG/DL (ref 0–1.2)
BILIRUB UR QL STRIP: NEGATIVE
BLOOD BANK SAMPLE EXPIRATION: NORMAL
BLOOD GROUP ANTIBODIES SERPL: NEGATIVE
BUN SERPL-MCNC: 58 MG/DL (ref 8–23)
CALCIUM SERPL-MCNC: 9.4 MG/DL (ref 8.6–10.4)
CASTS #/AREA URNS LPF: ABNORMAL /LPF
CHLORIDE SERPL-SCNC: 103 MMOL/L (ref 98–107)
CLARITY UR: CLEAR
CO2 SERPL-SCNC: 26 MMOL/L (ref 20–31)
COLOR UR: YELLOW
CREAT SERPL-MCNC: 2.6 MG/DL (ref 0.7–1.2)
EOSINOPHIL # BLD: 0.2 K/UL (ref 0–0.4)
EOSINOPHILS RELATIVE PERCENT: 3 % (ref 0–4)
EPI CELLS #/AREA URNS HPF: ABNORMAL /HPF
ERYTHROCYTE [DISTWIDTH] IN BLOOD BY AUTOMATED COUNT: 14.6 % (ref 11.5–14.9)
GFR, ESTIMATED: 23 ML/MIN/1.73M2
GLUCOSE SERPL-MCNC: 101 MG/DL (ref 74–99)
GLUCOSE UR STRIP-MCNC: NEGATIVE MG/DL
HCT VFR BLD AUTO: 26 % (ref 41–53)
HGB BLD-MCNC: 8.7 G/DL (ref 13.5–17.5)
HGB UR QL STRIP.AUTO: NEGATIVE
INR PPP: 1.1
KETONES UR STRIP-MCNC: NEGATIVE MG/DL
LEUKOCYTE ESTERASE UR QL STRIP: ABNORMAL
LYMPHOCYTES NFR BLD: 1.3 K/UL (ref 1–4.8)
LYMPHOCYTES RELATIVE PERCENT: 18 % (ref 24–44)
MCH RBC QN AUTO: 30.1 PG (ref 26–34)
MCHC RBC AUTO-ENTMCNC: 33.6 G/DL (ref 31–37)
MCV RBC AUTO: 89.5 FL (ref 80–100)
MONOCYTES NFR BLD: 0.5 K/UL (ref 0.1–1.3)
MONOCYTES NFR BLD: 7 % (ref 1–7)
NEUTROPHILS NFR BLD: 72 % (ref 36–66)
NEUTS SEG NFR BLD: 5.2 K/UL (ref 1.3–9.1)
NITRITE UR QL STRIP: POSITIVE
PARTIAL THROMBOPLASTIN TIME: 29.2 SEC (ref 24–36)
PH UR STRIP: 5.5 [PH] (ref 5–8)
PLATELET # BLD AUTO: 233 K/UL (ref 150–450)
PMV BLD AUTO: 8.4 FL (ref 6–12)
POTASSIUM SERPL-SCNC: 3.7 MMOL/L (ref 3.7–5.3)
PROT SERPL-MCNC: 6 G/DL (ref 6.6–8.7)
PROT UR STRIP-MCNC: NEGATIVE MG/DL
PROTHROMBIN TIME: 15 SEC (ref 11.8–14.6)
RBC # BLD AUTO: 2.91 M/UL (ref 4.5–5.9)
RBC #/AREA URNS HPF: ABNORMAL /HPF
SODIUM SERPL-SCNC: 142 MMOL/L (ref 136–145)
SP GR UR STRIP: 1.02 (ref 1–1.03)
UROBILINOGEN UR STRIP-ACNC: NORMAL EU/DL (ref 0–1)
WBC #/AREA URNS HPF: ABNORMAL /HPF
WBC OTHER # BLD: 7.2 K/UL (ref 3.5–11)

## 2025-02-21 PROCEDURE — 2580000003 HC RX 258

## 2025-02-21 PROCEDURE — 86850 RBC ANTIBODY SCREEN: CPT

## 2025-02-21 PROCEDURE — 6360000002 HC RX W HCPCS

## 2025-02-21 PROCEDURE — 85730 THROMBOPLASTIN TIME PARTIAL: CPT

## 2025-02-21 PROCEDURE — 87086 URINE CULTURE/COLONY COUNT: CPT

## 2025-02-21 PROCEDURE — 99285 EMERGENCY DEPT VISIT HI MDM: CPT

## 2025-02-21 PROCEDURE — 96374 THER/PROPH/DIAG INJ IV PUSH: CPT

## 2025-02-21 PROCEDURE — 80053 COMPREHEN METABOLIC PANEL: CPT

## 2025-02-21 PROCEDURE — 87077 CULTURE AEROBIC IDENTIFY: CPT

## 2025-02-21 PROCEDURE — G0378 HOSPITAL OBSERVATION PER HR: HCPCS

## 2025-02-21 PROCEDURE — 87186 SC STD MICRODIL/AGAR DIL: CPT

## 2025-02-21 PROCEDURE — 2060000000 HC ICU INTERMEDIATE R&B

## 2025-02-21 PROCEDURE — 81001 URINALYSIS AUTO W/SCOPE: CPT

## 2025-02-21 PROCEDURE — 99223 1ST HOSP IP/OBS HIGH 75: CPT

## 2025-02-21 PROCEDURE — 96375 TX/PRO/DX INJ NEW DRUG ADDON: CPT

## 2025-02-21 PROCEDURE — 36415 COLL VENOUS BLD VENIPUNCTURE: CPT

## 2025-02-21 PROCEDURE — 85025 COMPLETE CBC W/AUTO DIFF WBC: CPT

## 2025-02-21 PROCEDURE — 96376 TX/PRO/DX INJ SAME DRUG ADON: CPT

## 2025-02-21 PROCEDURE — 2500000003 HC RX 250 WO HCPCS

## 2025-02-21 PROCEDURE — 86901 BLOOD TYPING SEROLOGIC RH(D): CPT

## 2025-02-21 PROCEDURE — 85610 PROTHROMBIN TIME: CPT

## 2025-02-21 PROCEDURE — 86900 BLOOD TYPING SEROLOGIC ABO: CPT

## 2025-02-21 RX ORDER — SODIUM CHLORIDE 0.9 % (FLUSH) 0.9 %
5-40 SYRINGE (ML) INJECTION EVERY 12 HOURS SCHEDULED
Status: DISCONTINUED | OUTPATIENT
Start: 2025-02-21 | End: 2025-02-24 | Stop reason: HOSPADM

## 2025-02-21 RX ORDER — SODIUM CHLORIDE 9 MG/ML
INJECTION, SOLUTION INTRAVENOUS PRN
Status: DISCONTINUED | OUTPATIENT
Start: 2025-02-21 | End: 2025-02-24 | Stop reason: HOSPADM

## 2025-02-21 RX ORDER — POLYETHYLENE GLYCOL 3350 17 G/17G
17 POWDER, FOR SOLUTION ORAL DAILY PRN
Status: DISCONTINUED | OUTPATIENT
Start: 2025-02-21 | End: 2025-02-24 | Stop reason: HOSPADM

## 2025-02-21 RX ORDER — ACETAMINOPHEN 650 MG/1
650 SUPPOSITORY RECTAL EVERY 6 HOURS PRN
Status: DISCONTINUED | OUTPATIENT
Start: 2025-02-21 | End: 2025-02-24 | Stop reason: HOSPADM

## 2025-02-21 RX ORDER — ONDANSETRON 2 MG/ML
4 INJECTION INTRAMUSCULAR; INTRAVENOUS EVERY 6 HOURS PRN
Status: DISCONTINUED | OUTPATIENT
Start: 2025-02-21 | End: 2025-02-24 | Stop reason: HOSPADM

## 2025-02-21 RX ORDER — SODIUM CHLORIDE 9 MG/ML
INJECTION, SOLUTION INTRAVENOUS CONTINUOUS
Status: DISCONTINUED | OUTPATIENT
Start: 2025-02-21 | End: 2025-02-24 | Stop reason: HOSPADM

## 2025-02-21 RX ORDER — ONDANSETRON 4 MG/1
4 TABLET, ORALLY DISINTEGRATING ORAL EVERY 8 HOURS PRN
Status: DISCONTINUED | OUTPATIENT
Start: 2025-02-21 | End: 2025-02-24 | Stop reason: HOSPADM

## 2025-02-21 RX ORDER — ACETAMINOPHEN 325 MG/1
650 TABLET ORAL EVERY 6 HOURS PRN
Status: DISCONTINUED | OUTPATIENT
Start: 2025-02-21 | End: 2025-02-24 | Stop reason: HOSPADM

## 2025-02-21 RX ORDER — SODIUM CHLORIDE 0.9 % (FLUSH) 0.9 %
5-40 SYRINGE (ML) INJECTION PRN
Status: DISCONTINUED | OUTPATIENT
Start: 2025-02-21 | End: 2025-02-24 | Stop reason: HOSPADM

## 2025-02-21 RX ADMIN — SODIUM CHLORIDE, PRESERVATIVE FREE 10 ML: 5 INJECTION INTRAVENOUS at 22:26

## 2025-02-21 RX ADMIN — SODIUM CHLORIDE: 0.9 INJECTION, SOLUTION INTRAVENOUS at 18:47

## 2025-02-21 RX ADMIN — SODIUM CHLORIDE 80 MG: 9 INJECTION INTRAMUSCULAR; INTRAVENOUS; SUBCUTANEOUS at 16:37

## 2025-02-21 RX ADMIN — PANTOPRAZOLE SODIUM 8 MG/HR: 40 INJECTION, POWDER, FOR SOLUTION INTRAVENOUS at 22:25

## 2025-02-21 RX ADMIN — WATER 1000 MG: 1 INJECTION INTRAMUSCULAR; INTRAVENOUS; SUBCUTANEOUS at 22:19

## 2025-02-21 ASSESSMENT — LIFESTYLE VARIABLES
HOW OFTEN DO YOU HAVE A DRINK CONTAINING ALCOHOL: NEVER
HOW MANY STANDARD DRINKS CONTAINING ALCOHOL DO YOU HAVE ON A TYPICAL DAY: PATIENT DOES NOT DRINK

## 2025-02-21 ASSESSMENT — PAIN - FUNCTIONAL ASSESSMENT: PAIN_FUNCTIONAL_ASSESSMENT: 0-10

## 2025-02-21 ASSESSMENT — PAIN DESCRIPTION - LOCATION
LOCATION: BACK
LOCATION: BACK

## 2025-02-21 ASSESSMENT — PAIN SCALES - GENERAL
PAINLEVEL_OUTOF10: 3
PAINLEVEL_OUTOF10: 7

## 2025-02-21 ASSESSMENT — PAIN DESCRIPTION - ORIENTATION: ORIENTATION: LOWER

## 2025-02-21 NOTE — ED PROVIDER NOTES
[facility] EMERGENCY DEPARTMENT  eMERGENCY dEPARTMENT eNCOUnter   Attending Attestation     Pt Name: Gustabo Ortiz  MRN: [id number]  Birthdate 1940  Date of evaluation: 2/21/25    History, EXAM, MDM:    Gustabo Ortiz is a 84 y.o. male who presents with Back Pain and Melena    3 episodes of black stool over the last few days.  Persistent back pain since a fall, has been taking ibuprofen.  H/o bleeding stomach ulcer.  Hemodynamically stable on presentation.  No sign of hemorrhagic shock.  Pt found to be anemic.  Starting protonix drip.  Consult GI.  Admitting for treatment of upper GIB.     Vitals:   Vitals:    02/21/25 1519   BP: 116/71   Pulse: 73   Resp: 18   Temp: 97.8 °F (36.6 °C)   TempSrc: Oral   SpO2: 98%   Weight: 83.9 kg (185 lb)   Height: 1.676 m (5' 6\")     I performed a history and physical examination of the patient and discussed management with the resident. I reviewed the resident’s note and agree with the documented findings and plan of care. Any areas of disagreement are noted on the chart. I was personally present for the key portions of any procedures. I have documented in the chart those procedures where I was not present during the key portions. I have personally reviewed all images and agree with the resident's interpretation. I have reviewed the emergency nurses triage note. I agree with the chief complaint, past medical history, past surgical history, allergies, medications, social and family history as documented unless otherwise noted below. Documentation of the HPI, Physical Exam and Medical Decision Making performed by medical students or scribes is based on my personal performance of the HPI, PE and MDM. For Phys Assistant/ Nurse Practitioner cases/documentation I have had a face to face evaluation of this patient and have completed at least one if not all key elements of the E/M (history, physical exam, and MDM). Additional findings are as noted.    Nima Murphy,  Per  staff patient cancelled appointment today due to not having wound vac yet. RN called patient - pt had not received a call for delivery from the company - [facility] [facility]. Pt confirmed his appointment for Friday this week with provider.  RN sent a message to [employer] [facility] rep for an update on the order.

## 2025-02-21 NOTE — ED NOTES
Report given to MINAL Sousa from [facility].   Report method by phone   The following was reviewed with receiving RN:   Current vital signs:  /89   Pulse 65   Temp 97.8 °F (36.6 °C) (Oral)   Resp 18   Ht 1.676 m (5' 6\")   Wt 83.9 kg (185 lb)   SpO2 93%   BMI 29.86 kg/m²                MEWS Score: 1     Any medication or safety alerts were reviewed. Any pending diagnostics and notifications were also reviewed, as well as any safety concerns or issues, abnormal labs, abnormal imaging, and abnormal assessment findings. Questions were answered.

## 2025-02-21 NOTE — ED PROVIDER NOTES
[facility] EMERGENCY DEPARTMENT  Emergency Department Encounter  Emergency Medicine Resident     Pt Name:Gustabo Ortiz  MRN: [id number]  Birthdate 1940  Date of evaluation: 2/21/25  PCP:  Daniel Perez  Note Started: 4:19 PM EST      CHIEF COMPLAINT       Chief Complaint   Patient presents with    Back Pain    Melena       HISTORY OF PRESENT ILLNESS  (Location/Symptom, Timing/Onset, Context/Setting, Quality, Duration, Modifying Factors, Severity.)      Gustabo Ortiz is a 84 y.o. male with history of recent mechanical fall on 2/11, found to have an L3 TP fracture, follow-up scheduled with Dr. Morris on 3/18 presents with persistent lumbar back pain and melena.  Patient states he has been taking ibuprofen every 4 hours for pain relief symptoms.  He also takes a baby aspirin daily.  He first noticed blood in his stool 2 days prior, and is described as black.  He describes 3 loose stools over the last 2 days.  He states he has a prior history of gastric ulcer.  He denies episodes of presyncope or syncope.  He does state he feels lightheaded intermittently.  No new falls or traumatic injuries, denies nausea/vomiting or vision changes.  He is able to ambulate without difficulty.  His back pain is particularly worse in the morning upon wakening and improves throughout the day.  Denies saddle anesthesia, fever, chills, myalgias night sweats.    PAST MEDICAL / SURGICAL / SOCIAL / FAMILY HISTORY      has a past medical history of Abdominal pain, Cancer (HCC), Gastric ulcer, and GERD (gastroesophageal reflux disease).       has a past surgical history that includes Upper gastrointestinal endoscopy (6/27/11,  11/7/2011) and Colonoscopy (2010).      Social History     Socioeconomic History    Marital status:      Spouse name: Not on file    Number of children: Not on file    Years of education: Not on file    Highest education level: Not on file   Occupational History    Not on file   Tobacco Use

## 2025-02-21 NOTE — H&P
2 /HPF    Casts UA 10 TO 20 (A) None /LPF    Epithelial Cells, UA 0 TO 2 /HPF    Bacteria, UA MANY (A) None       Imaging/Diagnostics:  No results found.    Assessment :      Hospital Problems             Last Modified POA    * (Principal) GI bleed 2/21/2025 Yes       Plan:     Patient status inpatient in the Progressive Unit/Step down    84-year-old male came in with a mechanical fall.  Concerns for of upper GI bleed with melanotic stools.  Hold chemical AC.  IV Protonix.  GI consult.  Trend H&H.  FOBT.  Back pain.  Acute displaced fracture of L3?  Orthopedic surgery consult.  No point tenderness.  DONNA.  Prerenal?  Continue gentle fluid hydration.  Will bladder scan, renal ultrasound and UA with reflex.  No urinary complaints as per patient.  UTI.  Start Rocephin.  Urine culture follow-up.  No urinary complaints.  Prostate cancer.  Follows up with urology.  Hypertension.  Controlled.  Will resume medications as able.  DVT prophylaxis.  EPC cuffs.    Consultations:   IP CONSULT TO GI  IP CONSULT TO CASE MANAGEMENT  IP CONSULT TO ORTHOPEDIC SURGERY    Patient is admitted as inpatient status because of co-morbidities listed above, severity of signs and symptoms as outlined, requirement for current medical therapies and most importantly because of direct risk to patient if care not provided in a hospital setting.  Expected length of stay > 48 hours.    Jenn Jeong MD  2/21/2025  9:38 PM    Copy sent to Daniel Clifton    Please note that this chart was generated using voice recognition Dragon dictation software.  Although every effort was made to ensure the accuracy of this automated transcription, some errors in transcription may have occurred.

## 2025-02-22 ENCOUNTER — APPOINTMENT (OUTPATIENT)
Dept: ULTRASOUND IMAGING | Age: 85
DRG: 378 | End: 2025-02-22
Payer: MEDICARE

## 2025-02-22 PROBLEM — S32.009D CLOSED FRACTURE OF TRANSVERSE PROCESS OF LUMBAR VERTEBRA WITH ROUTINE HEALING: Status: ACTIVE | Noted: 2025-02-22

## 2025-02-22 PROBLEM — M48.061 SPINAL STENOSIS, LUMBAR REGION, WITHOUT NEUROGENIC CLAUDICATION: Status: ACTIVE | Noted: 2025-02-22

## 2025-02-22 PROBLEM — M54.50 ACUTE MIDLINE LOW BACK PAIN WITHOUT SCIATICA: Status: ACTIVE | Noted: 2025-02-22

## 2025-02-22 PROBLEM — D64.9 ANEMIA: Status: ACTIVE | Noted: 2025-02-22

## 2025-02-22 PROBLEM — K92.1 MELENA: Status: ACTIVE | Noted: 2025-02-22
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
## 2025-02-22 LAB
ALBUMIN SERPL-MCNC: 3.3 G/DL (ref 3.5–5.2)
ALP SERPL-CCNC: 52 U/L (ref 40–129)
ALT SERPL-CCNC: 13 U/L (ref 10–50)
ANION GAP SERPL CALCULATED.3IONS-SCNC: 10 MMOL/L (ref 9–16)
AST SERPL-CCNC: 15 U/L (ref 10–50)
BASOPHILS # BLD: 0 K/UL (ref 0–0.2)
BASOPHILS NFR BLD: 1 % (ref 0–2)
BILIRUB SERPL-MCNC: 0.2 MG/DL (ref 0–1.2)
BUN SERPL-MCNC: 49 MG/DL (ref 8–23)
CALCIUM SERPL-MCNC: 8.8 MG/DL (ref 8.6–10.4)
CHLORIDE SERPL-SCNC: 108 MMOL/L (ref 98–107)
CO2 SERPL-SCNC: 27 MMOL/L (ref 20–31)
CREAT SERPL-MCNC: 2.4 MG/DL (ref 0.7–1.2)
EOSINOPHIL # BLD: 0.3 K/UL (ref 0–0.4)
EOSINOPHILS RELATIVE PERCENT: 5 % (ref 0–4)
ERYTHROCYTE [DISTWIDTH] IN BLOOD BY AUTOMATED COUNT: 14.7 % (ref 11.5–14.9)
FERRITIN SERPL-MCNC: 52 NG/ML
FERRITIN SERPL-MCNC: 54 NG/ML
FOLATE SERPL-MCNC: 36.5 NG/ML (ref 4.8–24.2)
FOLATE SERPL-MCNC: >40 NG/ML (ref 4.8–24.2)
GFR, ESTIMATED: 26 ML/MIN/1.73M2
GLUCOSE SERPL-MCNC: 93 MG/DL (ref 74–99)
HCT VFR BLD AUTO: 23.4 % (ref 41–53)
HCT VFR BLD AUTO: 23.9 % (ref 41–53)
HCT VFR BLD AUTO: 24 % (ref 41–53)
HCT VFR BLD AUTO: 24.6 % (ref 41–53)
HGB BLD-MCNC: 7.8 G/DL (ref 13.5–17.5)
HGB BLD-MCNC: 8 G/DL (ref 13.5–17.5)
HGB BLD-MCNC: 8.1 G/DL (ref 13.5–17.5)
HGB BLD-MCNC: 8.3 G/DL (ref 13.5–17.5)
IMM RETICS NFR: 14.9 % (ref 2.7–18.3)
IRON SATN MFR SERPL: 10 % (ref 20–55)
IRON SATN MFR SERPL: 11 % (ref 20–55)
IRON SERPL-MCNC: 24 UG/DL (ref 61–157)
IRON SERPL-MCNC: 25 UG/DL (ref 61–157)
LYMPHOCYTES NFR BLD: 1.4 K/UL (ref 1–4.8)
LYMPHOCYTES RELATIVE PERCENT: 26 % (ref 24–44)
MAGNESIUM SERPL-MCNC: 2.1 MG/DL (ref 1.6–2.4)
MCH RBC QN AUTO: 30.2 PG (ref 26–34)
MCHC RBC AUTO-ENTMCNC: 33.3 G/DL (ref 31–37)
MCV RBC AUTO: 90.6 FL (ref 80–100)
MONOCYTES NFR BLD: 0.4 K/UL (ref 0.1–1.3)
MONOCYTES NFR BLD: 8 % (ref 1–7)
NEUTROPHILS NFR BLD: 60 % (ref 36–66)
NEUTS SEG NFR BLD: 3.4 K/UL (ref 1.3–9.1)
PLATELET # BLD AUTO: 192 K/UL (ref 150–450)
PMV BLD AUTO: 8.3 FL (ref 6–12)
POTASSIUM SERPL-SCNC: 3.5 MMOL/L (ref 3.7–5.3)
PROT SERPL-MCNC: 5.4 G/DL (ref 6.6–8.7)
RBC # BLD AUTO: 2.64 M/UL (ref 4.5–5.9)
RETIC HEMOGLOBIN: 34.3 PG (ref 28.2–35.7)
RETICS # AUTO: 0.09 M/UL (ref 0.03–0.08)
RETICS/RBC NFR AUTO: 3.2 % (ref 0.5–1.9)
SODIUM SERPL-SCNC: 145 MMOL/L (ref 136–145)
TIBC SERPL-MCNC: 227 UG/DL (ref 250–450)
TIBC SERPL-MCNC: 243 UG/DL (ref 250–450)
UNSATURATED IRON BINDING CAPACITY: 202 UG/DL (ref 112–347)
UNSATURATED IRON BINDING CAPACITY: 219 UG/DL (ref 112–347)
VIT B12 SERPL-MCNC: 665 PG/ML (ref 232–1245)
VIT B12 SERPL-MCNC: 712 PG/ML (ref 232–1245)
WBC OTHER # BLD: 5.5 K/UL (ref 3.5–11)

## 2025-02-22 PROCEDURE — 82746 ASSAY OF FOLIC ACID SERUM: CPT

## 2025-02-22 PROCEDURE — APPNB30 APP NON BILLABLE TIME 0-30 MINS: Performed by: NURSE PRACTITIONER

## 2025-02-22 PROCEDURE — 2500000003 HC RX 250 WO HCPCS

## 2025-02-22 PROCEDURE — 83540 ASSAY OF IRON: CPT

## 2025-02-22 PROCEDURE — 97166 OT EVAL MOD COMPLEX 45 MIN: CPT

## 2025-02-22 PROCEDURE — 36415 COLL VENOUS BLD VENIPUNCTURE: CPT

## 2025-02-22 PROCEDURE — 96376 TX/PRO/DX INJ SAME DRUG ADON: CPT

## 2025-02-22 PROCEDURE — 85025 COMPLETE CBC W/AUTO DIFF WBC: CPT

## 2025-02-22 PROCEDURE — 85014 HEMATOCRIT: CPT

## 2025-02-22 PROCEDURE — 93005 ELECTROCARDIOGRAM TRACING: CPT

## 2025-02-22 PROCEDURE — 85018 HEMOGLOBIN: CPT

## 2025-02-22 PROCEDURE — 6370000000 HC RX 637 (ALT 250 FOR IP)

## 2025-02-22 PROCEDURE — 82607 VITAMIN B-12: CPT

## 2025-02-22 PROCEDURE — 2580000003 HC RX 258

## 2025-02-22 PROCEDURE — 99222 1ST HOSP IP/OBS MODERATE 55: CPT | Performed by: ORTHOPAEDIC SURGERY

## 2025-02-22 PROCEDURE — 85045 AUTOMATED RETICULOCYTE COUNT: CPT

## 2025-02-22 PROCEDURE — 99233 SBSQ HOSP IP/OBS HIGH 50: CPT

## 2025-02-22 PROCEDURE — 80053 COMPREHEN METABOLIC PANEL: CPT

## 2025-02-22 PROCEDURE — G0378 HOSPITAL OBSERVATION PER HR: HCPCS

## 2025-02-22 PROCEDURE — 82728 ASSAY OF FERRITIN: CPT

## 2025-02-22 PROCEDURE — 83550 IRON BINDING TEST: CPT

## 2025-02-22 PROCEDURE — 83735 ASSAY OF MAGNESIUM: CPT

## 2025-02-22 PROCEDURE — 6360000002 HC RX W HCPCS

## 2025-02-22 PROCEDURE — 2060000000 HC ICU INTERMEDIATE R&B

## 2025-02-22 PROCEDURE — 97116 GAIT TRAINING THERAPY: CPT

## 2025-02-22 PROCEDURE — 76775 US EXAM ABDO BACK WALL LIM: CPT

## 2025-02-22 PROCEDURE — 97530 THERAPEUTIC ACTIVITIES: CPT

## 2025-02-22 PROCEDURE — 97162 PT EVAL MOD COMPLEX 30 MIN: CPT

## 2025-02-22 RX ORDER — ATORVASTATIN CALCIUM 40 MG/1
40 TABLET, FILM COATED ORAL NIGHTLY
Status: DISCONTINUED | OUTPATIENT
Start: 2025-02-22 | End: 2025-02-24 | Stop reason: HOSPADM

## 2025-02-22 RX ADMIN — ATORVASTATIN CALCIUM 40 MG: 40 TABLET, FILM COATED ORAL at 19:57

## 2025-02-22 RX ADMIN — SODIUM CHLORIDE: 0.9 INJECTION, SOLUTION INTRAVENOUS at 05:08

## 2025-02-22 RX ADMIN — WATER 1000 MG: 1 INJECTION INTRAMUSCULAR; INTRAVENOUS; SUBCUTANEOUS at 22:26

## 2025-02-22 RX ADMIN — MICONAZOLE NITRATE: 20 POWDER TOPICAL at 19:57

## 2025-02-22 RX ADMIN — PANTOPRAZOLE SODIUM 8 MG/HR: 40 INJECTION, POWDER, FOR SOLUTION INTRAVENOUS at 17:07

## 2025-02-22 RX ADMIN — POTASSIUM BICARBONATE 40 MEQ: 782 TABLET, EFFERVESCENT ORAL at 15:27

## 2025-02-22 RX ADMIN — SODIUM CHLORIDE: 0.9 INJECTION, SOLUTION INTRAVENOUS at 19:56

## 2025-02-22 RX ADMIN — SODIUM CHLORIDE, PRESERVATIVE FREE 10 ML: 5 INJECTION INTRAVENOUS at 09:30

## 2025-02-22 RX ADMIN — PANTOPRAZOLE SODIUM 8 MG/HR: 40 INJECTION, POWDER, FOR SOLUTION INTRAVENOUS at 05:04

## 2025-02-22 RX ADMIN — MICONAZOLE NITRATE: 20 POWDER TOPICAL at 15:29

## 2025-02-22 NOTE — ACP (ADVANCE CARE PLANNING)
Advance Care Planning     Advance Care Planning Activator (Inpatient)  Conversation Note      Date of ACP Conversation: 2/22/2025     Conversation Conducted with: Patient with Decision Making Capacity    ACP Activator: Alberta No RN      Health Care Decision Maker:     Current Designated Health Care Decision Maker:     Primary Decision Maker: Gustabo Ortiz Jr - Child - [phone]    Primary Decision Maker: krunal brownnig - Child - [phone]  Click here to complete Healthcare Decision Makers including section of the Healthcare Decision Maker Relationship (ie \"Primary\")  Today we documented Decision Maker(s) consistent with Legal Next of Kin hierarchy.    Care Preferences    Ventilation:  \"If you were in your present state of health and suddenly became very ill and were unable to breathe on your own, what would your preference be about the use of a ventilator (breathing machine) if it were available to you?\"      Would the patient desire the use of ventilator (breathing machine)?: yes    \"If your health worsens and it becomes clear that your chance of recovery is unlikely, what would your preference be about the use of a ventilator (breathing machine) if it were available to you?\"     Would the patient desire the use of ventilator (breathing machine)?: Yes      Resuscitation  \"CPR works best to restart the heart when there is a sudden event, like a heart attack, in someone who is otherwise healthy. Unfortunately, CPR does not typically restart the heart for people who have serious health conditions or who are very sick.\"    \"In the event your heart stopped as a result of an underlying serious health condition, would you want attempts to be made to restart your heart (answer \"yes\" for attempt to resuscitate) or would you prefer a natural death (answer \"no\" for do not attempt to resuscitate)?\" yes       [] Yes   [] No   Educated Patient / Decision Maker regarding differences between Advance Directives and

## 2025-02-22 NOTE — CARE COORDINATION
Case Management Assessment  Initial Evaluation    Date/Time of Evaluation: 2/22/2025 10:09 AM  Assessment Completed by: Alberta No RN    If patient is discharged prior to next notation, then this note serves as note for discharge by case management.    Patient Name: Gustabo Ortiz                   YOB: 1940  Diagnosis: GI bleed [K92.2]  Upper GI bleed [K92.2]                   Date / Time: 2/21/2025  4:06 PM    Patient Admission Status: Inpatient   Readmission Risk (Low < 19, Mod (19-27), High > 27): Readmission Risk Score: 13.4    Current PCP: Daniel Perez  PCP verified by CM? Yes    Chart Reviewed: Yes      History Provided by: Patient  Patient Orientation: Alert and Oriented    Patient Cognition: Alert    Hospitalization in the last 30 days (Readmission):  No    If yes, Readmission Assessment in CM Navigator will be completed.    Advance Directives:      Code Status: Full Code   Patient's Primary Decision Maker is: Legal Next of Kin    Primary Decision Maker: Gustabo Ortiz [facility] Child - [phone]    Primary Decision Maker: krunal browning  Child - [phone]    Discharge Planning:    Patient lives with: Alone Type of Home: House  Primary Care Giver: Self  Patient Support Systems include: Children   Current Financial resources: Medicare  Current community resources:    Current services prior to admission: None            Current DME:              Type of Home Care services:  None    ADLS  Prior functional level: Independent in ADLs/IADLs  Current functional level: Independent in ADLs/IADLs    PT AM-PAC:   /24  OT AM-PAC:   /24    Family can provide assistance at DC: Yes  Would you like Case Management to discuss the discharge plan with any other family members/significant others, and if so, who? No  Plans to Return to Present Housing: Yes  Other Identified Issues/Barriers to RETURNING to current housing: no  Potential Assistance needed at discharge: N/A            Potential

## 2025-02-22 NOTE — CONSULTS
Date:   2/22/2025  Patient name: Gustabo Ortiz  Date of admission:  2/21/2025  4:06 PM  MRN:   [id number]  YOB: 1940  PCP: Daniel Perez    Reason for Admission: GI bleed [K92.2]  Upper GI bleed [K92.2]    Cardiology consult: New onset A-fib       Referring physician: Dr aBrajas Jean-Paul    Impression  New onset A-fib heart rate under control  Admission 2/21/2025 with a GI bleed, melanotic stool, hemoglobin 8.7  History of gastric ulcer, gastroesophageal reflux disease  Hypertension  Chronic kidney disease  Left carotid bruit diminished carotid upstroke  History of prostate cancer  UTI  Severe lumbar spine degenerative changes L2-L5 spinal canal stenosis  Limited right shoulder movement    Home medications include  Valsartan 80 mg a day, simvastatin 40 mg nightly, Viagra 50 mg as needed, fish oil 1 capsule a day, Norco 5/325 mg as needed, omeprazole 20 mg a day, Linzess 145 mcg 1 capsule a day, metoprolol 25 mg twice a day, ibuprofen 600 mg as needed      History of present illness  84-year-old male retired  who lives alone, still drives who has past medical history of hypertension, hyperlipidemia, prostate cancer, previous history of peptic ulcer disease, GI bleed about 15 years ago who has been on Prilosec got hospitalized on 2/21/2025 with melena ongoing for few days no abdominal pain.  No history of exertional angina no peripheral edema.  He has been taking ibuprofen for back pain.  He had a fall few days ago he lost balance.  No previous history of CVA    Blood pressure on admission 116/70 heart rate 73 oxygen saturation 98% temperature 97.8  Lab work on admission  Urine positive for nitrite moderate leukocyte Estrace WBC more than 10 bacteria many  Sodium 142 potassium 3.7 BUN 58 creatinine 2.6, glucose 101, albumin 3.8  WBC 7.2 hemoglobin 8.7 platelets 233    Current evaluation  Patient seen and examined  He was resting at the edge of bed  He was awake and

## 2025-02-22 NOTE — FLOWSHEET NOTE
02/22/25 0444   Treatment Team Notification   Reason for Communication Evaluate   Name of Team Member Notified Belinda FERREIRA   Treatment Team Role Advanced Practice Nurse   Method of Communication Secure Message   Response Waiting for response   Notification Time 0445         patient has redness to the right armpit he states it does not hurt but uncomfortable. could he please have  micotine powder to help with the irritation?  Request sent to NP and awaiting response

## 2025-02-22 NOTE — CONSULTS
[facility]    GASTROENTEROLOGY CONSULT    Patient:   Gustabo Ortiz   :    1940   Facility:   [facility]  Date:    2025  Admission Dx:  GI bleed [K92.2]  Upper GI bleed [K92.2]  Requesting physician: Jenn Jeong MD  Reason for consult:  melena      SUBJECTIVE:  History of Present Illness:  This is a 84 y.o.   male who was admitted 2025 with GI bleed [K92.2]  Upper GI bleed [K92.2]. We have been asked to see the patient in consultation by Jenn Jeong MD for  melena. This is a 84 year old male with pmh of prostate cancer gerd gastric ulcer afib on aspirin and xaralto  who presented to the ED for back pain and melena. He is being treated for a UTI States he fell a few days ago and was taking motrin for his back pain no fever or emesis. Melena for the last 3-4 days, denies abdominal pain,  hgb 8.7>8  ct lumbar spine  shows possible acute mildly displaced fracture of the right L3 transverse process and severe diffuse degenerative changes of the lumbar spine with severe spinal canal stenosis L2-L5.    Endoscopy colonoscopy  that removed benign polyps per pt, egd I  that showed gastric ulcer- per pt    OBJECTIVE:    PAST MEDICAL/SURGICAL HISTORY  Past Medical History:   Diagnosis Date    Abdominal pain     Cancer (HCC)     Hx of prostate    Gastric ulcer     GERD (gastroesophageal reflux disease)      Past Surgical History:   Procedure Laterality Date    COLONOSCOPY      per pt, \"some polyps\"    UPPER GASTROINTESTINAL ENDOSCOPY  11,  2011       ALLERGIES:  No Known Allergies    HOME MEDICATIONS:  Prior to Admission medications    Medication Sig Start Date End Date Taking? Authorizing Provider   lisinopril-hydroCHLOROthiazide (PRINZIDE;ZESTORETIC) 20-12.5 MG per tablet TAKE 1 TABLET BY MOUTH ONCE DAILY FOR 90 DAYS 20  Yes Provider, MD Cornel   valsartan (DIOVAN) 80 MG tablet  6/15/18  Yes Provider,

## 2025-02-22 NOTE — CONSULTS
Inpatient consult to Orthopedic Surgery  Consult performed by: Javier Morris MD  Consult ordered by: Jenn Jeong MD        Patient: Gustabo Ortiz  Unit/Bed: 2086/2086-01  YOB: 1940  MRN: [id number]  Acct: [id number]   Admitting Diagnosis: GI bleed [K92.2]  Upper GI bleed [K92.2]  Admit Date:  2/21/2025  Hospital Day: 1    Subjective:    Patient is having problems with  low back pain  Back Pain      Patient Seen, Chart, Labs, Radiologystudies, and Consults reviewed.      Pt with ground level fall and TP fractures L2 & 3 on 2/11 referred to me outpatient    Pt admitted with GI bleed    Objective:   /72   Pulse (!) 119   Temp 97.7 °F (36.5 °C) (Oral)   Resp 18   Ht 1.676 m (5' 6\")   Wt 81.9 kg (180 lb 8.9 oz)   SpO2 92%   BMI 29.14 kg/m²   Intake/Output Summary (Last 24 hours) at 2/22/2025 0855  Last data filed at 2/22/2025 0455  Gross per 24 hour   Intake 100 ml   Output 500 ml   Net -400 ml     Review of Systems   Musculoskeletal:  Positive for back pain.     Physical Exam  Vitals and nursing note reviewed.   Constitutional:       Appearance: He is well-developed.   HENT:      Head: Normocephalic and atraumatic.      Nose: Nose normal.   Eyes:      Conjunctiva/sclera: Conjunctivae normal.   Pulmonary:      Effort: Pulmonary effort is normal. No respiratory distress.   Musculoskeletal:      Cervical back: Normal range of motion and neck supple.      Comments: N/v intact resting in bed     Skin:     General: Skin is warm and dry.   Neurological:      Mental Status: He is alert and oriented to person, place, and time.      Sensory: No sensory deficit.   Psychiatric:         Behavior: Behavior normal.         Thought Content: Thought content normal.           Drains:No  Imaging:Yes Lumbar spinal stensosis L2-3 and 4-5  with right transverse process fracture of L2 &3       Medications:    miconazole   Topical BID    sodium chloride flush  5-40 mL IntraVENous 2 times per day

## 2025-02-23 ENCOUNTER — ANESTHESIA EVENT (OUTPATIENT)
Dept: ENDOSCOPY | Age: 85
End: 2025-02-23
Payer: MEDICARE

## 2025-02-23 ENCOUNTER — ANESTHESIA (OUTPATIENT)
Dept: ENDOSCOPY | Age: 85
End: 2025-02-23
Payer: MEDICARE

## 2025-02-23 PROBLEM — K92.2 UPPER GI BLEED: Status: ACTIVE | Noted: 2025-02-23

## 2025-02-23 LAB
ANION GAP SERPL CALCULATED.3IONS-SCNC: 9 MMOL/L (ref 9–16)
BUN SERPL-MCNC: 34 MG/DL (ref 8–23)
CALCIUM SERPL-MCNC: 8.3 MG/DL (ref 8.6–10.4)
CHLORIDE SERPL-SCNC: 109 MMOL/L (ref 98–107)
CO2 SERPL-SCNC: 25 MMOL/L (ref 20–31)
CREAT SERPL-MCNC: 1.9 MG/DL (ref 0.7–1.2)
GFR, ESTIMATED: 34 ML/MIN/1.73M2
GLUCOSE SERPL-MCNC: 99 MG/DL (ref 74–99)
HCT VFR BLD AUTO: 22.9 % (ref 41–53)
HCT VFR BLD AUTO: 23.5 % (ref 41–53)
HCT VFR BLD AUTO: 25.4 % (ref 41–53)
HGB BLD-MCNC: 7.7 G/DL (ref 13.5–17.5)
HGB BLD-MCNC: 7.8 G/DL (ref 13.5–17.5)
HGB BLD-MCNC: 8.5 G/DL (ref 13.5–17.5)
POTASSIUM SERPL-SCNC: 3.5 MMOL/L (ref 3.7–5.3)
SODIUM SERPL-SCNC: 143 MMOL/L (ref 136–145)

## 2025-02-23 PROCEDURE — 96376 TX/PRO/DX INJ SAME DRUG ADON: CPT

## 2025-02-23 PROCEDURE — 36415 COLL VENOUS BLD VENIPUNCTURE: CPT

## 2025-02-23 PROCEDURE — 88305 TISSUE EXAM BY PATHOLOGIST: CPT

## 2025-02-23 PROCEDURE — 0DB68ZX EXCISION OF STOMACH, VIA NATURAL OR ARTIFICIAL OPENING ENDOSCOPIC, DIAGNOSTIC: ICD-10-PCS | Performed by: INTERNAL MEDICINE

## 2025-02-23 PROCEDURE — 7100000001 HC PACU RECOVERY - ADDTL 15 MIN: Performed by: INTERNAL MEDICINE

## 2025-02-23 PROCEDURE — 2580000003 HC RX 258: Performed by: INTERNAL MEDICINE

## 2025-02-23 PROCEDURE — 2580000003 HC RX 258: Performed by: NURSE PRACTITIONER

## 2025-02-23 PROCEDURE — 88342 IMHCHEM/IMCYTCHM 1ST ANTB: CPT

## 2025-02-23 PROCEDURE — 6360000002 HC RX W HCPCS: Performed by: NURSE PRACTITIONER

## 2025-02-23 PROCEDURE — 2580000003 HC RX 258: Performed by: ANESTHESIOLOGY

## 2025-02-23 PROCEDURE — 6360000002 HC RX W HCPCS: Performed by: INTERNAL MEDICINE

## 2025-02-23 PROCEDURE — 99233 SBSQ HOSP IP/OBS HIGH 50: CPT

## 2025-02-23 PROCEDURE — 85014 HEMATOCRIT: CPT

## 2025-02-23 PROCEDURE — 85018 HEMOGLOBIN: CPT

## 2025-02-23 PROCEDURE — 80048 BASIC METABOLIC PNL TOTAL CA: CPT

## 2025-02-23 PROCEDURE — 6360000002 HC RX W HCPCS

## 2025-02-23 PROCEDURE — 6370000000 HC RX 637 (ALT 250 FOR IP): Performed by: ANESTHESIOLOGY

## 2025-02-23 PROCEDURE — 43239 EGD BIOPSY SINGLE/MULTIPLE: CPT | Performed by: INTERNAL MEDICINE

## 2025-02-23 PROCEDURE — 2709999900 HC NON-CHARGEABLE SUPPLY: Performed by: INTERNAL MEDICINE

## 2025-02-23 PROCEDURE — 3609012400 HC EGD TRANSORAL BIOPSY SINGLE/MULTIPLE: Performed by: INTERNAL MEDICINE

## 2025-02-23 PROCEDURE — 2500000003 HC RX 250 WO HCPCS: Performed by: INTERNAL MEDICINE

## 2025-02-23 PROCEDURE — 99222 1ST HOSP IP/OBS MODERATE 55: CPT | Performed by: INTERNAL MEDICINE

## 2025-02-23 PROCEDURE — 6370000000 HC RX 637 (ALT 250 FOR IP): Performed by: INTERNAL MEDICINE

## 2025-02-23 PROCEDURE — G0378 HOSPITAL OBSERVATION PER HR: HCPCS

## 2025-02-23 PROCEDURE — 2580000003 HC RX 258

## 2025-02-23 PROCEDURE — 7100000000 HC PACU RECOVERY - FIRST 15 MIN: Performed by: INTERNAL MEDICINE

## 2025-02-23 PROCEDURE — 3700000000 HC ANESTHESIA ATTENDED CARE: Performed by: INTERNAL MEDICINE

## 2025-02-23 PROCEDURE — 6360000002 HC RX W HCPCS: Performed by: ANESTHESIOLOGY

## 2025-02-23 PROCEDURE — 2060000000 HC ICU INTERMEDIATE R&B

## 2025-02-23 RX ORDER — POTASSIUM CHLORIDE 7.45 MG/ML
10 INJECTION INTRAVENOUS
Status: DISPENSED | OUTPATIENT
Start: 2025-02-23 | End: 2025-02-23

## 2025-02-23 RX ORDER — SODIUM CHLORIDE 9 MG/ML
INJECTION, SOLUTION INTRAVENOUS
Status: DISCONTINUED | OUTPATIENT
Start: 2025-02-23 | End: 2025-02-23 | Stop reason: SDUPTHER

## 2025-02-23 RX ORDER — POTASSIUM CHLORIDE 7.45 MG/ML
10 INJECTION INTRAVENOUS
Status: COMPLETED | OUTPATIENT
Start: 2025-02-23 | End: 2025-02-23

## 2025-02-23 RX ORDER — LIDOCAINE HYDROCHLORIDE 40 MG/ML
SOLUTION TOPICAL
Status: DISCONTINUED | OUTPATIENT
Start: 2025-02-23 | End: 2025-02-23 | Stop reason: SDUPTHER

## 2025-02-23 RX ORDER — PROPOFOL 10 MG/ML
INJECTION, EMULSION INTRAVENOUS
Status: DISCONTINUED | OUTPATIENT
Start: 2025-02-23 | End: 2025-02-23 | Stop reason: SDUPTHER

## 2025-02-23 RX ORDER — LIDOCAINE HYDROCHLORIDE 10 MG/ML
INJECTION, SOLUTION EPIDURAL; INFILTRATION; INTRACAUDAL; PERINEURAL
Status: DISCONTINUED | OUTPATIENT
Start: 2025-02-23 | End: 2025-02-23 | Stop reason: SDUPTHER

## 2025-02-23 RX ADMIN — ATORVASTATIN CALCIUM 40 MG: 40 TABLET, FILM COATED ORAL at 20:23

## 2025-02-23 RX ADMIN — SODIUM CHLORIDE, PRESERVATIVE FREE 10 ML: 5 INJECTION INTRAVENOUS at 20:24

## 2025-02-23 RX ADMIN — LIDOCAINE HYDROCHLORIDE 50 MG: 10 INJECTION, SOLUTION EPIDURAL; INFILTRATION; INTRACAUDAL; PERINEURAL at 12:27

## 2025-02-23 RX ADMIN — SODIUM CHLORIDE 40 MG: 9 INJECTION INTRAMUSCULAR; INTRAVENOUS; SUBCUTANEOUS at 17:45

## 2025-02-23 RX ADMIN — SODIUM CHLORIDE: 9 INJECTION, SOLUTION INTRAVENOUS at 12:20

## 2025-02-23 RX ADMIN — SODIUM CHLORIDE: 0.9 INJECTION, SOLUTION INTRAVENOUS at 22:19

## 2025-02-23 RX ADMIN — POTASSIUM CHLORIDE 10 MEQ: 7.46 INJECTION, SOLUTION INTRAVENOUS at 17:43

## 2025-02-23 RX ADMIN — POTASSIUM CHLORIDE 10 MEQ: 7.46 INJECTION, SOLUTION INTRAVENOUS at 13:28

## 2025-02-23 RX ADMIN — MICONAZOLE NITRATE: 20 POWDER TOPICAL at 15:48

## 2025-02-23 RX ADMIN — PROPOFOL 50 MG: 10 INJECTION, EMULSION INTRAVENOUS at 12:26

## 2025-02-23 RX ADMIN — MICONAZOLE NITRATE: 20 POWDER TOPICAL at 20:23

## 2025-02-23 RX ADMIN — PANTOPRAZOLE SODIUM 8 MG/HR: 40 INJECTION, POWDER, FOR SOLUTION INTRAVENOUS at 04:17

## 2025-02-23 RX ADMIN — SODIUM CHLORIDE, PRESERVATIVE FREE 10 ML: 5 INJECTION INTRAVENOUS at 15:49

## 2025-02-23 RX ADMIN — WATER 1000 MG: 1 INJECTION INTRAMUSCULAR; INTRAVENOUS; SUBCUTANEOUS at 22:09

## 2025-02-23 RX ADMIN — SODIUM CHLORIDE 200 MG: 9 INJECTION, SOLUTION INTRAVENOUS at 15:44

## 2025-02-23 RX ADMIN — POTASSIUM CHLORIDE 10 MEQ: 7.46 INJECTION, SOLUTION INTRAVENOUS at 11:21

## 2025-02-23 RX ADMIN — POTASSIUM CHLORIDE 10 MEQ: 7.46 INJECTION, SOLUTION INTRAVENOUS at 15:50

## 2025-02-23 RX ADMIN — LIDOCAINE HYDROCHLORIDE 4 ML: 40 SOLUTION TOPICAL at 12:19

## 2025-02-23 RX ADMIN — SODIUM CHLORIDE: 0.9 INJECTION, SOLUTION INTRAVENOUS at 09:50

## 2025-02-23 ASSESSMENT — PAIN - FUNCTIONAL ASSESSMENT: PAIN_FUNCTIONAL_ASSESSMENT: 0-10

## 2025-02-23 NOTE — OP NOTE
PROCEDURE NOTE    DATE OF PROCEDURE: 2/23/2025     SURGEON: Farhad Leija MD  Facility: \"[facility]  ASSISTANT: None  Anesthesia: Monitored anesthesia care  PREOPERATIVE DIAGNOSIS: Melena    Diagnosis:    POSTOPERATIVE DIAGNOSIS: As described below    OPERATION: Upper GI endoscopy with Biopsy    ANESTHESIA: Moderate Sedation     ESTIMATED BLOOD LOSS: Less than 50 ml    COMPLICATIONS: None.     SPECIMENS:  Was Obtained: gastric ulcer biopsy    HISTORY: The patient is a 84 y.o. year old male with history of above preop diagnosis.  I recommended esophagogastroduodenoscopy with possible biopsy and I explained the risk, benefits, expected outcome, and alternatives to the procedure.  Risks included but are not limited to bleeding, infection, respiratory distress, hypotension, and perforation of the esophagus, stomach, or duodenum.  Patient understands and is in agreement.      PROCEDURE: The patient was given IV conscious sedation.  The patient's SPO2 remained above 90% throughout the procedure.The gastroscope was inserted orally and advanced under direct vision through the esophagus, through the stomach, through the pylorus, and into the descending duodenum.      Post sedation note :The patient's SPO2 remained above 90% throughout the procedure.the vital signs remained stable , and no immediate complication form the procedure noted, patient will be ready for d/c when criteria is met .      Findings:    Retropharyngeal area was grossly normal appearing    Esophagus: normal;     Esophagogastric markings: Diaphragmatic hiatus- 41 cm; GE junction- 41 cm; Squamo-columnar junction- 41 cm    Stomach:    Fundus: normal    Body: normal    Antrum: abnormal: large ulcer (1.3 cm diameter) on posterior wall, clean based; superficial erosions  Biopsy obtained     Duodenum:     Descending: normal    Bulb: abnormal: patchy erythema with erosions    The scope was removed and the patient tolerated the procedure well.

## 2025-02-24 ENCOUNTER — APPOINTMENT (OUTPATIENT)
Dept: VASCULAR LAB | Age: 85
DRG: 378 | End: 2025-02-24
Attending: INTERNAL MEDICINE
Payer: MEDICARE

## 2025-02-24 ENCOUNTER — APPOINTMENT (OUTPATIENT)
Age: 85
DRG: 378 | End: 2025-02-24
Attending: INTERNAL MEDICINE
Payer: MEDICARE

## 2025-02-24 VITALS
HEART RATE: 91 BPM | BODY MASS INDEX: 28.93 KG/M2 | SYSTOLIC BLOOD PRESSURE: 156 MMHG | OXYGEN SATURATION: 94 % | HEIGHT: 66 IN | DIASTOLIC BLOOD PRESSURE: 96 MMHG | WEIGHT: 180 LBS | TEMPERATURE: 98.1 F | RESPIRATION RATE: 18 BRPM

## 2025-02-24 LAB
ANION GAP SERPL CALCULATED.3IONS-SCNC: 10 MMOL/L (ref 9–16)
BASOPHILS # BLD: 0 K/UL (ref 0–0.2)
BASOPHILS NFR BLD: 0 % (ref 0–2)
BUN SERPL-MCNC: 20 MG/DL (ref 8–23)
CALCIUM SERPL-MCNC: 8.6 MG/DL (ref 8.6–10.4)
CHLORIDE SERPL-SCNC: 109 MMOL/L (ref 98–107)
CO2 SERPL-SCNC: 23 MMOL/L (ref 20–31)
CREAT SERPL-MCNC: 1.6 MG/DL (ref 0.7–1.2)
ECHO AO ASC DIAM: 4.3 CM
ECHO AO ASCENDING AORTA INDEX: 2.25 CM/M2
ECHO AO ROOT DIAM: 3.4 CM
ECHO AO ROOT INDEX: 1.78 CM/M2
ECHO AV AREA PEAK VELOCITY: 2.2 CM2
ECHO AV AREA VTI: 2.4 CM2
ECHO AV AREA/BSA PEAK VELOCITY: 1.2 CM2/M2
ECHO AV AREA/BSA VTI: 1.3 CM2/M2
ECHO AV MEAN GRADIENT: 6 MMHG
ECHO AV MEAN VELOCITY: 1.1 M/S
ECHO AV PEAK GRADIENT: 11 MMHG
ECHO AV PEAK VELOCITY: 1.7 M/S
ECHO AV VELOCITY RATIO: 0.59
ECHO AV VTI: 33.3 CM
ECHO BSA: 1.95 M2
ECHO BSA: 1.95 M2
ECHO EST RA PRESSURE: 3 MMHG
ECHO LA AREA 2C: 26.7 CM2
ECHO LA AREA 4C: 28.4 CM2
ECHO LA DIAMETER INDEX: 2.46 CM/M2
ECHO LA DIAMETER: 4.7 CM
ECHO LA MAJOR AXIS: 8.3 CM
ECHO LA MINOR AXIS: 7.5 CM
ECHO LA TO AORTIC ROOT RATIO: 1.38
ECHO LA VOL BP: 83 ML (ref 18–58)
ECHO LA VOL MOD A2C: 77 ML (ref 18–58)
ECHO LA VOL MOD A4C: 81 ML (ref 18–58)
ECHO LA VOL/BSA BIPLANE: 43 ML/M2 (ref 16–34)
ECHO LA VOLUME INDEX MOD A2C: 40 ML/M2 (ref 16–34)
ECHO LA VOLUME INDEX MOD A4C: 42 ML/M2 (ref 16–34)
ECHO LV EF PHYSICIAN: 55 %
ECHO LV FRACTIONAL SHORTENING: 27 % (ref 28–44)
ECHO LV INTERNAL DIMENSION DIASTOLE INDEX: 2.51 CM/M2
ECHO LV INTERNAL DIMENSION DIASTOLIC: 4.8 CM (ref 4.2–5.9)
ECHO LV INTERNAL DIMENSION SYSTOLIC INDEX: 1.83 CM/M2
ECHO LV INTERNAL DIMENSION SYSTOLIC: 3.5 CM
ECHO LV IVSD: 1 CM (ref 0.6–1)
ECHO LV MASS 2D: 206.4 G (ref 88–224)
ECHO LV MASS INDEX 2D: 108 G/M2 (ref 49–115)
ECHO LV POSTERIOR WALL DIASTOLIC: 1.3 CM (ref 0.6–1)
ECHO LV RELATIVE WALL THICKNESS RATIO: 0.54
ECHO LVOT AREA: 3.8 CM2
ECHO LVOT AV VTI INDEX: 0.62
ECHO LVOT DIAM: 2.2 CM
ECHO LVOT MEAN GRADIENT: 2 MMHG
ECHO LVOT PEAK GRADIENT: 4 MMHG
ECHO LVOT PEAK VELOCITY: 1 M/S
ECHO LVOT STROKE VOLUME INDEX: 41 ML/M2
ECHO LVOT SV: 78.3 ML
ECHO LVOT VTI: 20.6 CM
ECHO PV MAX VELOCITY: 0.9 M/S
ECHO PV PEAK GRADIENT: 3 MMHG
ECHO RA AREA 4C: 20.3 CM2
ECHO RA END SYSTOLIC VOLUME APICAL 4 CHAMBER INDEX BSA: 23 ML/M2
ECHO RA VOLUME: 43 ML
ECHO RIGHT VENTRICULAR SYSTOLIC PRESSURE (RVSP): 33 MMHG
ECHO RV FREE WALL PEAK S': 10 CM/S
ECHO RV INTERNAL DIMENSION: 3.6 CM
ECHO RV TAPSE: 1.5 CM (ref 1.7–?)
ECHO TV REGURGITANT MAX VELOCITY: 2.72 M/S
ECHO TV REGURGITANT PEAK GRADIENT: 30 MMHG
EKG Q-T INTERVAL: 426 MS
EKG QRS DURATION: 98 MS
EKG QTC CALCULATION (BAZETT): 462 MS
EKG R AXIS: -26 DEGREES
EKG T AXIS: -12 DEGREES
EKG VENTRICULAR RATE: 71 BPM
EOSINOPHIL # BLD: 0.3 K/UL (ref 0–0.4)
EOSINOPHILS RELATIVE PERCENT: 4 % (ref 0–4)
ERYTHROCYTE [DISTWIDTH] IN BLOOD BY AUTOMATED COUNT: 15.2 % (ref 11.5–14.9)
GFR, ESTIMATED: 42 ML/MIN/1.73M2
GLUCOSE SERPL-MCNC: 97 MG/DL (ref 74–99)
HCT VFR BLD AUTO: 25.8 % (ref 41–53)
HCT VFR BLD AUTO: 27 % (ref 41–53)
HCT VFR BLD AUTO: 27.7 % (ref 41–53)
HCT VFR BLD AUTO: 27.9 % (ref 41–53)
HGB BLD-MCNC: 8.3 G/DL (ref 13.5–17.5)
HGB BLD-MCNC: 8.7 G/DL (ref 13.5–17.5)
HGB BLD-MCNC: 8.9 G/DL (ref 13.5–17.5)
HGB BLD-MCNC: 9.2 G/DL (ref 13.5–17.5)
LYMPHOCYTES NFR BLD: 1.9 K/UL (ref 1–4.8)
LYMPHOCYTES RELATIVE PERCENT: 24 % (ref 24–44)
MCH RBC QN AUTO: 29.7 PG (ref 26–34)
MCHC RBC AUTO-ENTMCNC: 32.8 G/DL (ref 31–37)
MCV RBC AUTO: 90.8 FL (ref 80–100)
MICROORGANISM SPEC CULT: ABNORMAL
MONOCYTES NFR BLD: 0.5 K/UL (ref 0.1–1.3)
MONOCYTES NFR BLD: 7 % (ref 1–7)
NEUTROPHILS NFR BLD: 65 % (ref 36–66)
NEUTS SEG NFR BLD: 5.2 K/UL (ref 1.3–9.1)
PATH REV BLD -IMP: NORMAL
PLATELET # BLD AUTO: 250 K/UL (ref 150–450)
PMV BLD AUTO: 8.1 FL (ref 6–12)
POTASSIUM SERPL-SCNC: 3.9 MMOL/L (ref 3.7–5.3)
RBC # BLD AUTO: 2.98 M/UL (ref 4.5–5.9)
SODIUM SERPL-SCNC: 142 MMOL/L (ref 136–145)
SPECIMEN DESCRIPTION: ABNORMAL
SURGICAL PATHOLOGY REPORT: NORMAL
VAS LEFT CCA DIST EDV: 24.5 CM/S
VAS LEFT CCA DIST PSV: 64.7 CM/S
VAS LEFT CCA MID EDV: 15.42 CM/S
VAS LEFT CCA MID PSV: 60.79 CM/S
VAS LEFT CCA PROX EDV: 8.9 CM/S
VAS LEFT CCA PROX PSV: 67.3 CM/S
VAS LEFT ECA EDV: 0 CM/S
VAS LEFT ECA PSV: 48.3 CM/S
VAS LEFT ICA DIST EDV: 14.4 CM/S
VAS LEFT ICA DIST PSV: 85.5 CM/S
VAS LEFT ICA MID EDV: 17.5 CM/S
VAS LEFT ICA MID PSV: 78 CM/S
VAS LEFT ICA PROX EDV: 18.6 CM/S
VAS LEFT ICA PROX PSV: 60.4 CM/S
VAS LEFT ICA/CCA PSV: 1.41 NO UNITS
VAS LEFT VERTEBRAL EDV: 19.31 CM/S
VAS LEFT VERTEBRAL PSV: 58.2 CM/S
VAS RIGHT CCA DIST EDV: 10.9 CM/S
VAS RIGHT CCA DIST PSV: 42.8 CM/S
VAS RIGHT CCA MID EDV: 11.99 CM/S
VAS RIGHT CCA MID PSV: 55.99 CM/S
VAS RIGHT CCA PROX EDV: 14.2 CM/S
VAS RIGHT CCA PROX PSV: 65.9 CM/S
VAS RIGHT ECA EDV: 5.05 CM/S
VAS RIGHT ECA PSV: 81.5 CM/S
VAS RIGHT ICA DIST EDV: 15.3 CM/S
VAS RIGHT ICA DIST PSV: 51.6 CM/S
VAS RIGHT ICA MID EDV: 20.8 CM/S
VAS RIGHT ICA MID PSV: 72.5 CM/S
VAS RIGHT ICA PROX EDV: 18.2 CM/S
VAS RIGHT ICA PROX PSV: 58.4 CM/S
VAS RIGHT ICA/CCA PSV: 1.3 NO UNITS
VAS RIGHT VERTEBRAL EDV: 7.65 CM/S
VAS RIGHT VERTEBRAL PSV: 73.8 CM/S
WBC OTHER # BLD: 8 K/UL (ref 3.5–11)

## 2025-02-24 PROCEDURE — 80048 BASIC METABOLIC PNL TOTAL CA: CPT

## 2025-02-24 PROCEDURE — 96365 THER/PROPH/DIAG IV INF INIT: CPT

## 2025-02-24 PROCEDURE — 85025 COMPLETE CBC W/AUTO DIFF WBC: CPT

## 2025-02-24 PROCEDURE — 97535 SELF CARE MNGMENT TRAINING: CPT

## 2025-02-24 PROCEDURE — 93880 EXTRACRANIAL BILAT STUDY: CPT | Performed by: SURGERY

## 2025-02-24 PROCEDURE — 36415 COLL VENOUS BLD VENIPUNCTURE: CPT

## 2025-02-24 PROCEDURE — 85018 HEMOGLOBIN: CPT

## 2025-02-24 PROCEDURE — 93306 TTE W/DOPPLER COMPLETE: CPT | Performed by: INTERNAL MEDICINE

## 2025-02-24 PROCEDURE — 6360000002 HC RX W HCPCS: Performed by: NURSE PRACTITIONER

## 2025-02-24 PROCEDURE — 6360000002 HC RX W HCPCS: Performed by: INTERNAL MEDICINE

## 2025-02-24 PROCEDURE — 93306 TTE W/DOPPLER COMPLETE: CPT

## 2025-02-24 PROCEDURE — 97110 THERAPEUTIC EXERCISES: CPT

## 2025-02-24 PROCEDURE — 97116 GAIT TRAINING THERAPY: CPT

## 2025-02-24 PROCEDURE — 2580000003 HC RX 258: Performed by: INTERNAL MEDICINE

## 2025-02-24 PROCEDURE — 85014 HEMATOCRIT: CPT

## 2025-02-24 PROCEDURE — 6370000000 HC RX 637 (ALT 250 FOR IP)

## 2025-02-24 PROCEDURE — 93880 EXTRACRANIAL BILAT STUDY: CPT

## 2025-02-24 PROCEDURE — APPSS30 APP SPLIT SHARED TIME 16-30 MINUTES: Performed by: NURSE PRACTITIONER

## 2025-02-24 PROCEDURE — G0378 HOSPITAL OBSERVATION PER HR: HCPCS

## 2025-02-24 PROCEDURE — 96376 TX/PRO/DX INJ SAME DRUG ADON: CPT

## 2025-02-24 PROCEDURE — 2580000003 HC RX 258: Performed by: NURSE PRACTITIONER

## 2025-02-24 PROCEDURE — 99233 SBSQ HOSP IP/OBS HIGH 50: CPT

## 2025-02-24 PROCEDURE — 99232 SBSQ HOSP IP/OBS MODERATE 35: CPT | Performed by: INTERNAL MEDICINE

## 2025-02-24 PROCEDURE — 93010 ELECTROCARDIOGRAM REPORT: CPT | Performed by: INTERNAL MEDICINE

## 2025-02-24 RX ORDER — FERROUS SULFATE 325(65) MG
325 TABLET ORAL
Qty: 30 TABLET | Refills: 0 | Status: SHIPPED | OUTPATIENT
Start: 2025-02-24

## 2025-02-24 RX ORDER — PANTOPRAZOLE SODIUM 40 MG/1
40 TABLET, DELAYED RELEASE ORAL
Qty: 90 TABLET | Refills: 1 | Status: SHIPPED | OUTPATIENT
Start: 2025-02-24

## 2025-02-24 RX ORDER — CEPHALEXIN 500 MG/1
500 CAPSULE ORAL 2 TIMES DAILY
Qty: 14 CAPSULE | Refills: 0 | Status: SHIPPED | OUTPATIENT
Start: 2025-02-24 | End: 2025-03-03

## 2025-02-24 RX ORDER — AMLODIPINE BESYLATE 2.5 MG/1
2.5 TABLET ORAL DAILY
Status: DISCONTINUED | OUTPATIENT
Start: 2025-02-24 | End: 2025-02-24 | Stop reason: HOSPADM

## 2025-02-24 RX ORDER — AMLODIPINE BESYLATE 2.5 MG/1
2.5 TABLET ORAL DAILY
Qty: 30 TABLET | Refills: 0 | Status: SHIPPED | OUTPATIENT
Start: 2025-02-25

## 2025-02-24 RX ADMIN — SODIUM CHLORIDE: 0.9 INJECTION, SOLUTION INTRAVENOUS at 11:26

## 2025-02-24 RX ADMIN — SODIUM CHLORIDE 200 MG: 9 INJECTION, SOLUTION INTRAVENOUS at 11:26

## 2025-02-24 RX ADMIN — AMLODIPINE BESYLATE 2.5 MG: 2.5 TABLET ORAL at 16:07

## 2025-02-24 RX ADMIN — SODIUM CHLORIDE 40 MG: 9 INJECTION INTRAMUSCULAR; INTRAVENOUS; SUBCUTANEOUS at 03:19

## 2025-02-24 RX ADMIN — MICONAZOLE NITRATE: 20 POWDER TOPICAL at 11:19

## 2025-02-24 RX ADMIN — SODIUM CHLORIDE 40 MG: 9 INJECTION INTRAMUSCULAR; INTRAVENOUS; SUBCUTANEOUS at 16:07

## 2025-02-24 NOTE — CARE COORDINATION
ONGOING DISCHARGE PLAN:    Patient is alert and oriented x4.    Spoke with patient regarding discharge plan and patient confirms that plan is still home without needs. Pt has declined VNS.    Active order for IV Rocephin. Awaiting urine cult.    Cre 1.6. today.    Hgb 8.9. EGD yesterday showed gastric ulcer, erosive gastritis, duodenitis.    Will continue to follow for additional discharge needs.    If patient is discharged prior to next notation, then this note serves as note for discharge by case management.    Electronically signed by Alberta No RN on 2/24/2025 at 10:47 AM

## 2025-02-24 NOTE — PLAN OF CARE
Problem: Safety - Adult  Goal: Free from fall injury  2/23/2025 0346 by Allan Heller RN  Outcome: Progressing  Note: No falls noted this shift. Patient ambulates with x1 staff assistance without difficulty.  Bed kept in low position. Safe environment maintained. Bedside table & call light in reach. Uses call light appropriately when needing assistance.       Problem: Discharge Planning  Goal: Discharge to home or other facility with appropriate resources  2/23/2025 0346 by Allan Heller RN  Outcome: Progressing     Problem: Pain  Goal: Verbalizes/displays adequate comfort level or baseline comfort level  2/23/2025 0346 by Allan Heller RN  Outcome: Progressing  Note: No pain at this time.  0/10 pain scale.      Problem: ABCDS Injury Assessment  Goal: Absence of physical injury  2/23/2025 0346 by Allan Heller RN  Outcome: Progressing     Problem: Metabolic/Fluid and Electrolytes - Adult  Goal: Electrolytes maintained within normal limits  2/23/2025 0346 by Allan Heller RN  Outcome: Progressing     Problem: Metabolic/Fluid and Electrolytes - Adult  Goal: Hemodynamic stability and optimal renal function maintained  2/23/2025 0346 by Allan Heller RN  Outcome: Progressing     Problem: Hematologic - Adult  Goal: Maintains hematologic stability  2/23/2025 0346 by Allan Heller RN  Outcome: Progressing     
  Problem: Safety - Adult  Goal: Free from fall injury  2/24/2025 0334 by Allan Heller RN  Outcome: Progressing  Note: No falls noted this shift. Patient ambulates with x1 staff assistance without difficulty.  Bed kept in low position. Safe environment maintained. Bedside table & call light in reach. Uses call light appropriately when needing assistance.       Problem: Discharge Planning  Goal: Discharge to home or other facility with appropriate resources  2/24/2025 0334 by Allan Heller RN  Outcome: Progressing     Problem: Pain  Goal: Verbalizes/displays adequate comfort level or baseline comfort level  2/24/2025 0334 by Allan Heller RN  Outcome: Progressing  Note: No pain at this time.  0/10 pain scale.      Problem: ABCDS Injury Assessment  Goal: Absence of physical injury  2/24/2025 0334 by Allan Heller RN  Outcome: Progressing     Problem: Metabolic/Fluid and Electrolytes - Adult  Goal: Electrolytes maintained within normal limits  2/24/2025 0334 by Allan Heller RN  Outcome: Progressing     Problem: Metabolic/Fluid and Electrolytes - Adult  Goal: Hemodynamic stability and optimal renal function maintained  2/24/2025 0334 by Allan Heller RN  Outcome: Progressing     Problem: Hematologic - Adult  Goal: Maintains hematologic stability  2/24/2025 0334 by Allan Heller RN  Outcome: Progressing     
  Problem: Safety - Adult  Goal: Free from fall injury  2/24/2025 1644 by Dena Suarez RN  Outcome: Completed  2/24/2025 0334 by Allan Heller RN  Outcome: Progressing  Note: No falls noted this shift. Patient ambulates with x1 staff assistance without difficulty.  Bed kept in low position. Safe environment maintained. Bedside table & call light in reach. Uses call light appropriately when needing assistance.       Problem: Discharge Planning  Goal: Discharge to home or other facility with appropriate resources  2/24/2025 1644 by Dena Suarez RN  Outcome: Completed  2/24/2025 0334 by Allan Heller RN  Outcome: Progressing     Problem: Pain  Goal: Verbalizes/displays adequate comfort level or baseline comfort level  2/24/2025 1644 by Dena Suarez RN  Outcome: Completed  2/24/2025 0334 by Allan Heller RN  Outcome: Progressing  Note: No pain at this time.  0/10 pain scale.      Problem: ABCDS Injury Assessment  Goal: Absence of physical injury  2/24/2025 1644 by Dena Suarez RN  Outcome: Completed  2/24/2025 0334 by Allan Heller RN  Outcome: Progressing     Problem: Metabolic/Fluid and Electrolytes - Adult  Goal: Electrolytes maintained within normal limits  2/24/2025 1644 by Dena Suarez RN  Outcome: Completed  2/24/2025 0334 by Allan Heller RN  Outcome: Progressing  Goal: Hemodynamic stability and optimal renal function maintained  2/24/2025 1644 by Dena Suarez RN  Outcome: Completed  2/24/2025 0334 by Allan Heller RN  Outcome: Progressing     Problem: Hematologic - Adult  Goal: Maintains hematologic stability  2/24/2025 1644 by Dena Suarez RN  Outcome: Completed  2/24/2025 0334 by Allan Heller RN  Outcome: Progressing     
  Problem: Safety - Adult  Goal: Free from fall injury  Outcome: Progressing  Flowsheets (Taken 2/23/2025 1816)  Free From Fall Injury: Instruct family/caregiver on patient safety  Note: The patient remained free from falls this shift, call light within reach, bed in locked and lowest position.  Side rails up x2.  Continue to monitor closely.       Problem: Discharge Planning  Goal: Discharge to home or other facility with appropriate resources  Outcome: Progressing  Flowsheets (Taken 2/22/2025 0314 by Mary Fraga, RN)  Discharge to home or other facility with appropriate resources:   Identify barriers to discharge with patient and caregiver   Identify discharge learning needs (meds, wound care, etc)   Refer to discharge planning if patient needs post-hospital services based on physician order or complex needs related to functional status, cognitive ability or social support system     Problem: Pain  Goal: Verbalizes/displays adequate comfort level or baseline comfort level  Outcome: Progressing  Flowsheets (Taken 2/22/2025 0314 by Mary Fraga, RN)  Verbalizes/displays adequate comfort level or baseline comfort level: Implement non-pharmacological measures as appropriate and evaluate response  Note: Patient pain well controlled this shift.      Problem: ABCDS Injury Assessment  Goal: Absence of physical injury  Outcome: Progressing  Flowsheets (Taken 2/22/2025 1712)  Absence of Physical Injury: Implement safety measures based on patient assessment  Note: Fall assessment performed and appropriate measures implemented. Room freed from clutter. Bed in lowest position with wheels locked. Call light in place. ID band in place.        Problem: Metabolic/Fluid and Electrolytes - Adult  Goal: Electrolytes maintained within normal limits  Outcome: Progressing  Flowsheets (Taken 2/22/2025 1715)  Electrolytes maintained within normal limits:   Monitor labs and assess patient for signs and symptoms of 
  Problem: Safety - Adult  Goal: Free from fall injury  Outcome: Progressing  Note: No falls noted this shift. Patient ambulates with x1 staff assistance without difficulty.  Bed kept in low position. Safe environment maintained. Bedside table & call light in reach. Uses call light appropriately when needing assistance.        Problem: Discharge Planning  Goal: Discharge to home or other facility with appropriate resources  Outcome: Progressing  Flowsheets  Taken 2/22/2025 0314  Discharge to home or other facility with appropriate resources:   Identify barriers to discharge with patient and caregiver   Identify discharge learning needs (meds, wound care, etc)   Refer to discharge planning if patient needs post-hospital services based on physician order or complex needs related to functional status, cognitive ability or social support system  Taken 2/22/2025 0004  Discharge to home or other facility with appropriate resources:   Identify barriers to discharge with patient and caregiver   Arrange for needed discharge resources and transportation as appropriate     Problem: Pain  Goal: Verbalizes/displays adequate comfort level or baseline comfort level  Outcome: Progressing  Flowsheets (Taken 2/22/2025 0314)  Verbalizes/displays adequate comfort level or baseline comfort level: Implement non-pharmacological measures as appropriate and evaluate response  Note: Pt medicated with pain medication prn.  Assessed all pain characteristics including level, type, location, frequency, and onset.  Non-pharmacologic interventions offered to pt as well.  Pt states pain is tolerable at this time.        
signs and symptoms of volume excess or deficit   Monitor intake, output and patient weight   Monitor urine specific gravity, serum osmolarity and serum sodium as indicated or ordered     Problem: Hematologic - Adult  Goal: Maintains hematologic stability  Flowsheets (Taken 2/22/2025 1715)  Maintains hematologic stability:   Assess for signs and symptoms of bleeding or hemorrhage   Administer blood products/factors as ordered

## 2025-02-24 NOTE — CARE COORDINATION
DISCHARGE PLANNING NOTE:    Message sent to Dr. Jeong to inform that urine culture is back.    Electronically signed by Alberta No RN on 2/24/2025 at 4:27 PM    Per Dr. Jeong, awaiting echo results, can d/c if low risk.    Follow for PO AC.    Electronically signed by Alberta No RN on 2/24/2025 at 4:45 PM

## 2025-02-24 NOTE — PROGRESS NOTES
[facility]    Gastroenterology Daily Progress Note      Patient:   Gustabo Ortiz   :    1940   Facility:   [facility]  Date:     2025  Consultant:   PATRICIO Sosa - CNP, CNP      SUBJECTIVE  84 y.o. male admitted 2025 with GI bleed [K92.2]  Upper GI bleed [K92.2] and seen for antral ulcer, the pt was seen and examined. He is s/\p egd yesterday that is discussed below. Had normal appearing stool this am. No abdominal pain hgb 8.9.        OBJECTIVE  Scheduled Meds:   iron sucrose  200 mg IntraVENous Q24H    pantoprazole (PROTONIX) 40 mg in sodium chloride (PF) 0.9 % 10 mL injection  40 mg IntraVENous Q12H    miconazole   Topical BID    atorvastatin  40 mg Oral Nightly    sodium chloride flush  5-40 mL IntraVENous 2 times per day    cefTRIAXone (ROCEPHIN) IV  1,000 mg IntraVENous Q24H       Vital Signs:  BP (!) 150/88   Pulse (!) 114   Temp 97.9 °F (36.6 °C)   Resp 18   Ht 1.676 m (5' 6\")   Wt 81.9 kg (180 lb 8.9 oz)   SpO2 91%   BMI 29.14 kg/m²    Review of Systems - History obtained from chart review and the patient  General ROS: negative  Respiratory ROS: no cough, shortness of breath, or wheezing  Cardiovascular ROS: no chest pain or dyspnea on exertion  Gastrointestinal ROS: no abdominal pain, change in bowel habits, or black or bloody stools   Physical Exam:     General Appearance: alert and oriented to person, place and time, well-developed and well-nourished, in no acute distress  Skin: warm and dry, no rash or erythema  Head: normocephalic and atraumatic  Eyes: pupils equal, round, and reactive to light, extraocular eye movements intact, conjunctivae normal  ENT: hearing grossly normal bilaterally  Neck: neck supple and non tender without mass, no thyromegaly or thyroid nodules, no cervical lymphadenopathy   Pulmonary/Chest: clear to auscultation bilaterally- no wheezes, rales or rhonchi, normal air movement, no respiratory 
84-year-old male who fell couple of days ago and hit his back.  Denies loss of consciousness.  Lost balance.  Been taking ibuprofen.  Came in for back pain and melanotic stools for the past 3 days.  No previous labs.  Hemoglobin 8.7.  Check FOBT.  GI consult.  IV Protonix infusion.  Possible EGD tomorrow.  Acute displaced fracture L3?   Orthopedic surgery consult.  No point tenderness noted on examination.  Avoid chemical anticoagulation  EPC cuffs.  DONNA.  Start gentle fluid hydration, bladder scan  Renal ultrasound  UA with reflex  Patient denies any urinary complaints.  Full note to follow.        
Date:   2/23/2025  Patient name: Gustabo Ortiz  Date of admission:  2/21/2025  4:06 PM  MRN:   [id number]  YOB: 1940  PCP: Daniel Perez    Reason for Admission: GI bleed [K92.2]  Upper GI bleed [K92.2]    Cardiology follow-up: Atrial fibrillation       Referring physician: Dr Barajas Jean-Paul     Impression  New onset A-fib heart rate under control  Admission 2/21/2025 with a GI bleed, melanotic stool, hemoglobin 8.7  Iron deficiency serum iron 24 ug/dl, saturation 10%  Normal B12 665 ng/ml and folate >40 ng/ml  History of gastric ulcer, had GI bleed 15 years ago, gastroesophageal reflux disease  Hypertension  Chronic kidney disease  Left carotid bruit diminished carotid upstroke  History of prostate cancer  UTI, urine culture positive for Klebsiella pneumonia  Severe lumbar spine degenerative changes L2-L5 spinal canal stenosis  Limited right shoulder movement     Home medications include  Valsartan 80 mg a day, simvastatin 40 mg nightly, Viagra 50 mg as needed, fish oil 1 capsule a day, Norco 5/325 mg as needed, omeprazole 20 mg a day, Linzess 145 mcg 1 capsule a day, metoprolol 25 mg twice a day, ibuprofen 600 mg as needed        History of present illness  84-year-old male retired  who lives alone, still drives who has past medical history of hypertension, hyperlipidemia, prostate cancer, previous history of peptic ulcer disease, GI bleed about 15 years ago who has been on Prilosec got hospitalized on 2/21/2025 with melena ongoing for few days no abdominal pain.  No history of exertional angina no peripheral edema.  He has been taking ibuprofen for back pain.  He had a fall few days ago he lost balance.  No previous history of CVA     Blood pressure on admission 116/70 heart rate 73 oxygen saturation 98% temperature 97.8  Lab work on admission  Urine positive for nitrite moderate leukocyte Estrace WBC more than 10 bacteria many  Sodium 142 potassium 3.7 BUN 58 creatinine 2.6, 
Dr. Morris added to pt's list for consult.  
Occupational Therapy Daily Treatment Note  Facility/Department: [facility]   Patient Name: Gustabo Ortiz        MRN: [id number]    : 1940    Date of Service: 2025    Chief Complaint   Patient presents with    Back Pain    Melena     Past Medical History:  has a past medical history of Abdominal pain, Cancer (HCC), Gastric ulcer, and GERD (gastroesophageal reflux disease).  Past Surgical History:  has a past surgical history that includes Upper gastrointestinal endoscopy (11,  2011); Colonoscopy (); and Upper gastrointestinal endoscopy (N/A, 2025).    Discharge Recommendations  Discharge Recommendations: Patient would benefit from continued therapy after discharge  OT Equipment Recommendations  Equipment Needed: No  Mobility Devices: Walker;ADL Assistive Devices  Walker: Rolling  ADL Assistive Devices: Reacher;Sock-Aid Hard  Other: use of RW (has)    Assessment  Performance deficits / Impairments: Decreased functional mobility ;Decreased ADL status;Decreased safe awareness;Decreased endurance;Decreased balance;Decreased high-level IADLs  Assessment: Pt seen for OT tx this date. Pt progressing towards OT goals, however continues to present with decreased ADL IND secondary to deficits noted above. At baseline, pt is IND with ADLs, IADLs, and functional mobility. Currently, pt is requiring SBA for transfers and SBA-CGA for functional mobility with/without use of RW, SBA for hygiene/grooming standing at sink, SBA LBD. Continued OT services are warranted while pt is hospitalized and upon d/c to maximize IND and safety and facilitate return to PLOF as pt lives alone. Pt does display the safe functional abilities to return to prior living arrangements as pt reports assist PRN from daughter.  Prognosis: Good  Decision Making: Medium Complexity  REQUIRES OT FOLLOW-UP: Yes  Activity Tolerance  Activity Tolerance: Patient Tolerated treatment well;Patient limited by fatigue  Activity 
Patient discharged to home per orders.  IV discontinued intact. Discharge orders reviewed written and verbal.  Patient states all belongings present.  Taken per wheelchair to awaiting vehicle.  
Patient has been NPO through the pending an EGD. Chlorhexidine wash completed, bed change done with  patient tolerating process well. Resting in bed with no sign of distress nor pain noted  
Physical Therapy        Physical Therapy Cancel Note      DATE: 2025    NAME: Gustabo Ortiz  MRN: [id number]   : 1940      Patient not seen this date for Physical Therapy due to:    Testing: Cx; patient declined at this time reporting he is waiting for EGD that was scheduled for 9:30 AM with time currently being 1137 at time of approach. Writer plans to reproach in the afternoon if time permitting. Will continue to follow for patient needs/updates.      Electronically signed by Patience Garza PTA on 2025 at 12:00 PM      
Physical Therapy    [facility]   Physical Therapy Evaluation  Date: 25  Patient Name: Gustabo Ortiz       Room: 6-01  MRN: [id number]  Account: [id number]   : 1940  (84 y.o.) Gender: male     Discharge Recommendations:  Discharge Recommendations: Patient would benefit from continued therapy after discharge, Therapy recommended at discharge     PT D/C Equipment  Equipment Needed:  (Pt has rolling walker at home)  Other: Pt has rolling walker at home  PT Equipment Recommendations  Other: Pt has rolling walker at home     Past Medical History:  has a past medical history of Abdominal pain, Cancer (HCC), Gastric ulcer, and GERD (gastroesophageal reflux disease).  Past Surgical History:   has a past surgical history that includes Upper gastrointestinal endoscopy (11,  2011) and Colonoscopy ().    Subjective  Subjective  Subjective: PT/RN agreeable for therapy.     General  Patient assessed for rehabilitation services?: Yes  Additional Pertinent Hx: This is a 84 y.o.   male who was admitted 2025 with GI bleed (K92.2)  Upper GI bleed (K92.2). We have been asked to see the patient in consultation by Jenn Jeong MD for  melena. This is a 84 year old male with pmh of prostate cancer gerd gastric ulcer afib on aspirin and xaralto  who presented to the ED for back pain and melena. He is being treated for a UTI States he fell a few days ago and was taking motrin for his back pain no fever or emesis. Melena for the last 3-4 days, denies abdominal pain,  hgb 8.7>8  ct lumbar spine  shows possible acute mildly displaced fracture of the right L3 transverse process and severe diffuse degenerative changes of the lumbar spine with severe spinal canal stenosis L2-L5.  Family/Caregiver Present:  (Daughter)  Referral Date : 25  Diagnosis: GI Bleed  Follows Commands: Within Functional Limits     Pain  Pre-Pain: 0  Post-Pain: 4  Pain Location: Other 
Physical Therapy  [facility]   Physical Therapy Treatment  Date: 25  Patient Name: Gustabo Ortiz       Room: 6/2086-01  MRN: [id number]  Account: [id number]   : 1940  (84 y.o.) Gender: male     Discharge Recommendations:  Discharge Recommendations: Patient would benefit from continued therapy after discharge, Therapy recommended at discharge     PT D/C Equipment  Equipment Needed:  (Pt has rolling walker at home)  Other: Pt has rolling walker at home  PT Equipment Recommendations  Other: Pt has rolling walker at home    General  Patient assessed for rehabilitation services?: Yes  Additional Pertinent Hx: This is a 84 y.o.   male who was admitted 2025 with GI bleed (K92.2)  Upper GI bleed (K92.2). We have been asked to see the patient in consultation by Jenn Jeong MD for  melena. This is a 84 year old male with pmh of prostate cancer gerd gastric ulcer afib on aspirin and xaralto  who presented to the ED for back pain and melena. He is being treated for a UTI States he fell a few days ago and was taking motrin for his back pain no fever or emesis. Melena for the last 3-4 days, denies abdominal pain,  hgb 8.7>8  ct lumbar spine  shows possible acute mildly displaced fracture of the right L3 transverse process and severe diffuse degenerative changes of the lumbar spine with severe spinal canal stenosis L2-L5.  Family/Caregiver Present:  (Daughter)  Referral Date : 25  Diagnosis: GI Bleed  Follows Commands: Within Functional Limits    Past Medical History:  has a past medical history of Abdominal pain, Cancer (HCC), Gastric ulcer, and GERD (gastroesophageal reflux disease).  Past Surgical History:   has a past surgical history that includes Upper gastrointestinal endoscopy (11,  2011); Colonoscopy (); and Upper gastrointestinal endoscopy (N/A, 2025).    Restrictions  Restrictions/Precautions  Restrictions/Precautions: General 
Pt arrived to floor from ED to room 2086.  Vitals taken and telemetry applied.  No distress noted. See doc flowsheet for details. Call light within reach, and pt educated on its use. Bed in lowest position, and locked. Side rails up x 2. Denied further questions or needs at this time. Will continue to monitor.  
Spoke with Gustabo , pt son, to inform family of pt scheduled EGD tomorrow @ [id number].  
Telemetry was showing bradycardia.  EKG orders given.  Shows new onset atrial fibrillation.  Rate controlled.  Admitted for concerns of GI bleed with melanotic stools.  Could not be on anticoagulation in anticipation of possible EGD by GI.  Cardiology consulted.  RN will reach out to GI if any plans for EGD if not, will start patient on heparin drip and closely monitor hemoglobin.  Hemoglobin has been stable over the course of his stay here.  Will check anemia panel as well.    
Urine culture sensitivities checked.  Will discharge on p.o. antibiotics.  Medication reconciled.  Discharge if echo low risk  Close follow-up with GI and cardiology.  
8.3* 8.9*     --  192  --   --   --  250    < > = values in this interval not displayed.     BMP:    Recent Labs     02/22/25  0622 02/23/25  0615 02/24/25  0615    143 142   K 3.5* 3.5* 3.9   * 109* 109*   CO2 27 25 23   BUN 49* 34* 20   CREATININE 2.4* 1.9* 1.6*   GLUCOSE 93 99 97     Hepatic:   Recent Labs     02/21/25  1425 02/22/25  0622   AST 19 15   ALT 17 13   BILITOT <0.2 0.2   ALKPHOS 60 52     Troponin: No results for input(s): \"TROPONINI\" in the last 72 hours.  BNP: No results for input(s): \"BNP\" in the last 72 hours.  Lipids: No results for input(s): \"CHOL\", \"HDL\" in the last 72 hours.    Invalid input(s): \"LDLCALCU\"  INR:   Recent Labs     02/21/25  1425   INR 1.1       Objective:   Vitals: BP (!) 150/88   Pulse (!) 114   Temp 97.9 °F (36.6 °C)   Resp 18   Ht 1.676 m (5' 6\")   Wt 81.9 kg (180 lb 8.9 oz)   SpO2 91%   BMI 29.14 kg/m²   General appearance: alert and cooperative with exam  HEENT: Head: Normal, normocephalic, atraumatic.  Neck: no JVD and supple, symmetrical, trachea midline  Lungs: diminished breath sounds bibasilar  Heart: irregularly irregular rhythm  Abdomen:  Obese soft  Extremities: Homans sign is negative, no sign of DVT  Neurologic: Mental status: Alert, oriented, thought content appropriate    EKG: A-fib heart rate 70.  ECHO: obtained and eviewed.   Ejection fraction: %  Stress Test: not obtained.  Cardiac Angiography: not obtained.    Assessment / Acute Cardiac Problems:     Admission 2/21/2025 with GI bleed, melena hemoglobin 8.3  Previous history of peptic ulcer disease gastric ulcer episode of GI bleed 15 years ago  EGD 2-23-25 Gastric ulcer , gastritis,   A-fib on admission heart rate under control  No history of exertional angina or coronary intervention  No history of TIA or CVA  Hypertension, hyperlipidemia on medication  History of prostate cancer follow-up with the urologist  Urinary tract infection  Chronic kidney disease  Left carotid 
anxiety    Physical Exam:   BP (!) 156/85   Pulse 89   Temp 97.7 °F (36.5 °C)   Resp 18   Ht 1.676 m (5' 6\")   Wt 81.9 kg (180 lb 8.9 oz)   SpO2 94%   BMI 29.14 kg/m²   Temp (24hrs), Av.9 °F (36.6 °C), Min:97.1 °F (36.2 °C), Max:98.8 °F (37.1 °C)    No results for input(s): \"POCGLU\" in the last 72 hours.    Intake/Output Summary (Last 24 hours) at 2025 1127  Last data filed at 2025 1045  Gross per 24 hour   Intake 1173.26 ml   Output 1000 ml   Net 173.26 ml       General Appearance: alert, well appearing, and in no acute distress  Mental status: oriented to person, place, and time  Head: normocephalic, atraumatic  Eye: no icterus, redness, pupils equal and reactive, extraocular eye movements intact, conjunctiva clear  Ear: normal external ear, no discharge, hearing intact  Nose: no drainage noted  Mouth: mucous membranes moist  Neck: supple, no carotid bruits, thyroid not palpable  Lungs: Bilateral equal air entry, clear to ausculation, no wheezing, rales or rhonchi, normal effort  Cardiovascular: normal rate, regular rhythm, no murmur, gallop, rub  Abdomen: Soft, nontender, nondistended, normal bowel sounds, no hepatomegaly or splenomegaly  Neurologic: There are no new focal motor or sensory deficits, normal muscle tone and bulk, no abnormal sensation, normal speech, cranial nerves II through XII grossly intact  Skin: No gross lesions, rashes, bruising or bleeding on exposed skin area  Extremities: peripheral pulses palpable, no pedal edema or calf pain with palpation  Psych: normal affect    Investigations:      Laboratory Testing:  Recent Results (from the past 24 hour(s))   Hemoglobin and Hematocrit    Collection Time: 25  5:10 PM   Result Value Ref Range    Hemoglobin 8.5 (L) 13.5 - 17.5 g/dL    Hematocrit 25.4 (L) 41 - 53 %   Hemoglobin and Hematocrit    Collection Time: 25  1:19 AM   Result Value Ref Range    Hemoglobin 8.3 (L) 13.5 - 17.5 g/dL    Hematocrit 27.7 (L) 41 - 53 % 
mmol/L    Chloride 109 (H) 98 - 107 mmol/L    CO2 25 20 - 31 mmol/L    Anion Gap 9 9 - 16 mmol/L    Glucose 99 74 - 99 mg/dL    BUN 34 (H) 8 - 23 mg/dL    Creatinine 1.9 (H) 0.7 - 1.2 mg/dL    Est, Glom Filt Rate 34 (L) >60 mL/min/1.73m2    Calcium 8.3 (L) 8.6 - 10.4 mg/dL   Hemoglobin and Hematocrit    Collection Time: 02/23/25  6:15 AM   Result Value Ref Range    Hemoglobin 7.7 (L) 13.5 - 17.5 g/dL    Hematocrit 22.9 (L) 41 - 53 %       Imaging/Diagnostics:  No results found.    Assessment :      Hospital Problems             Last Modified POA    * (Principal) Upper GI bleed 2/22/2025 Yes    Closed fracture of transverse process of lumbar vertebra with routine healing 2/22/2025 Yes    Lumbar spinal stenosis 2/22/2025 Yes    Acute midline low back pain without sciatica 2/22/2025 Yes    Fall 2/22/2025 Yes    Melena 2/22/2025 Yes    Anemia 2/22/2025 Yes       Plan:     Patient status inpatient in the Progressive Unit/Step down    84-year-old male came in with a mechanical fall.  Concerns for of upper GI bleed with melanotic stools.  Hold chemical AC.  IV Protonix.  GI consult.  Trend H&H.  FOBT.  Back pain.  Acute displaced fracture of L3?  Orthopedic surgery consult.  No point tenderness.  DONNA.  Prerenal?  Continue gentle fluid hydration.  Will bladder scan, renal ultrasound and UA with reflex.  No urinary complaints as per patient.  UTI.  Start Rocephin.  Urine culture follow-up.  No urinary complaints.  Prostate cancer.  Follows up with urology.  Hypertension.  Controlled.  Will resume medications as able.  DVT prophylaxis.  EPC cuffs.      2/22.  Patient seen and examined at bedside.  Afebrile, vital signs are stable.  Hemoglobin has been stable.  New onset atrial fibrillation this morning.  Cardiology on board.  Due to melanotic stools and previous history of gastric ulcer  They are okay with holding off anticoagulation at this point.  Continue to monitor H&H every 8.  Continue IV Protonix.  DONNA resolving.  
  Result Value Ref Range    WBC, UA 10 TO 20 (A) 0 TO 5 /HPF    RBC, UA 0 TO 2 0 TO 2 /HPF    Casts UA 10 TO 20 (A) None /LPF    Epithelial Cells, UA 0 TO 2 /HPF    Bacteria, UA MANY (A) None   Hemoglobin and Hematocrit    Collection Time: 02/22/25  1:10 AM   Result Value Ref Range    Hemoglobin 7.8 (L) 13.5 - 17.5 g/dL    Hematocrit 23.4 (L) 41 - 53 %   Comprehensive Metabolic Panel w/ Reflex to MG    Collection Time: 02/22/25  6:22 AM   Result Value Ref Range    Sodium 145 136 - 145 mmol/L    Potassium 3.5 (L) 3.7 - 5.3 mmol/L    Chloride 108 (H) 98 - 107 mmol/L    CO2 27 20 - 31 mmol/L    Anion Gap 10 9 - 16 mmol/L    Glucose 93 74 - 99 mg/dL    BUN 49 (H) 8 - 23 mg/dL    Creatinine 2.4 (H) 0.7 - 1.2 mg/dL    Est, Glom Filt Rate 26 (L) >60 mL/min/1.73m2    Calcium 8.8 8.6 - 10.4 mg/dL    Total Protein 5.4 (L) 6.6 - 8.7 g/dL    Albumin 3.3 (L) 3.5 - 5.2 g/dL    Total Bilirubin 0.2 0.0 - 1.2 mg/dL    Alkaline Phosphatase 52 40 - 129 U/L    ALT 13 10 - 50 U/L    AST 15 10 - 50 U/L   CBC with Auto Differential    Collection Time: 02/22/25  6:22 AM   Result Value Ref Range    WBC 5.5 3.5 - 11.0 k/uL    RBC 2.64 (L) 4.5 - 5.9 m/uL    Hemoglobin 8.0 (L) 13.5 - 17.5 g/dL    Hematocrit 24.0 (L) 41 - 53 %    MCV 90.6 80 - 100 fL    MCH 30.2 26 - 34 pg    MCHC 33.3 31 - 37 g/dL    RDW 14.7 11.5 - 14.9 %    Platelets 192 150 - 450 k/uL    MPV 8.3 6.0 - 12.0 fL    Neutrophils % 60 36 - 66 %    Lymphocytes % 26 24 - 44 %    Monocytes % 8 (H) 1 - 7 %    Eosinophils % 5 (H) 0 - 4 %    Basophils % 1 0 - 2 %    Neutrophils Absolute 3.40 1.3 - 9.1 k/uL    Lymphocytes Absolute 1.40 1.0 - 4.8 k/uL    Monocytes Absolute 0.40 0.1 - 1.3 k/uL    Eosinophils Absolute 0.30 0.0 - 0.4 k/uL    Basophils Absolute 0.00 0.0 - 0.2 k/uL   Magnesium    Collection Time: 02/22/25  6:22 AM   Result Value Ref Range    Magnesium 2.1 1.6 - 2.4 mg/dL   Hemoglobin and Hematocrit    Collection Time: 02/22/25  9:40 AM   Result Value Ref Range    Hemoglobin 
technique.  Education Method: Demonstration, Verbal  Barriers to Learning: Cognition, Hearing  Education Outcome: Continued education needed    Functional Outcome Measures  AM-PAC Daily Activity - Inpatient   How much help is needed for putting on and taking off regular lower body clothing?: A Little  How much help is needed for bathing (which includes washing, rinsing, drying)?: A Little  How much help is needed for toileting (which includes using toilet, bedpan, or urinal)?: A Little  How much help is needed for putting on and taking off regular upper body clothing?: A Little  How much help is needed for taking care of personal grooming?: A Little  How much help for eating meals?: None  AM-[facility] Inpatient Daily Activity Raw Score: 19  AM-PAC Inpatient ADL T-Scale Score : 40.22  ADL Inpatient CMS 0-100% Score: 42.8  ADL Inpatient CMS G-Code Modifier : CK     Goals  Short Term Goals  Time Frame for Short Term Goals: By discharge  Short Term Goal 1: Pt will complete UB and LB BADLs at [facility] with good safety and use of modified techniques/LRAD as needed.  Short Term Goal 2: Pt will complete functional transfers and mobility from all surfaces at [facility] with good safety and use of LRAD.  Short Term Goal 3: Pt will tolerate static/dynamic standing for 8+ minutes at [facility] to complete self-care or functional task of choice with use of LRAD.  Short Term Goal 4: Pt will verbalize/demonstrate good understanding of home safety and energy conservation/pain management techniques to improve safety and independence in self-care and functional tasks.  Short Term Goal 5: Pt will actively engage in 15+ minute of therapeutic exercise and functional activity to increase safety and independence in self-care and functional tasks    Plan  Occupational Therapy Plan  Times Per Week: 3-5  Current Treatment Recommendations: Strengthening, ROM, Balance training, Functional mobility training, Endurance training, Safety education & training,

## 2025-02-26 ENCOUNTER — HOSPITAL ENCOUNTER (OUTPATIENT)
Age: 85
Setting detail: SPECIMEN
Discharge: HOME | End: 2025-02-26

## 2025-02-26 DIAGNOSIS — K92.1 MELENA: ICD-10-CM

## 2025-02-26 DIAGNOSIS — D64.9 ANEMIA, UNSPECIFIED TYPE: ICD-10-CM

## 2025-02-26 LAB
ANION GAP SERPL CALCULATED.3IONS-SCNC: 12 MMOL/L (ref 9–16)
BASOPHILS # BLD: 0.03 K/UL (ref 0–0.2)
BASOPHILS NFR BLD: 0 % (ref 0–2)
BUN SERPL-MCNC: 14 MG/DL (ref 8–23)
CALCIUM SERPL-MCNC: 9.2 MG/DL (ref 8.6–10.4)
CHLORIDE SERPL-SCNC: 107 MMOL/L (ref 98–107)
CO2 SERPL-SCNC: 23 MMOL/L (ref 20–31)
CREAT SERPL-MCNC: 1.6 MG/DL (ref 0.7–1.2)
EOSINOPHIL # BLD: 0.18 K/UL (ref 0–0.44)
EOSINOPHILS RELATIVE PERCENT: 2 % (ref 1–4)
ERYTHROCYTE [DISTWIDTH] IN BLOOD BY AUTOMATED COUNT: 15.7 % (ref 11.8–14.4)
GFR, ESTIMATED: 42 ML/MIN/1.73M2
GLUCOSE SERPL-MCNC: 93 MG/DL (ref 74–99)
HCT VFR BLD AUTO: 27.9 % (ref 40.7–50.3)
HGB BLD-MCNC: 8.7 G/DL (ref 13–17)
IMM GRANULOCYTES # BLD AUTO: 0.05 K/UL (ref 0–0.3)
IMM GRANULOCYTES NFR BLD: 1 %
LYMPHOCYTES NFR BLD: 1.14 K/UL (ref 1.1–3.7)
LYMPHOCYTES RELATIVE PERCENT: 15 % (ref 24–43)
MCH RBC QN AUTO: 29.8 PG (ref 25.2–33.5)
MCHC RBC AUTO-ENTMCNC: 31.2 G/DL (ref 28.4–34.8)
MCV RBC AUTO: 95.5 FL (ref 82.6–102.9)
MONOCYTES NFR BLD: 0.46 K/UL (ref 0.1–1.2)
MONOCYTES NFR BLD: 6 % (ref 3–12)
NEUTROPHILS NFR BLD: 76 % (ref 36–65)
NEUTS SEG NFR BLD: 5.53 K/UL (ref 1.5–8.1)
NRBC BLD-RTO: 0 PER 100 WBC
PLATELET # BLD AUTO: 298 K/UL (ref 138–453)
PMV BLD AUTO: 10.2 FL (ref 8.1–13.5)
POTASSIUM SERPL-SCNC: 3.9 MMOL/L (ref 3.7–5.3)
RBC # BLD AUTO: 2.92 M/UL (ref 4.21–5.77)
RBC # BLD: ABNORMAL 10*6/UL
SODIUM SERPL-SCNC: 142 MMOL/L (ref 136–145)
SURGICAL PATHOLOGY REPORT: NORMAL
WBC OTHER # BLD: 7.4 K/UL (ref 3.5–11.3)

## 2025-03-18 ENCOUNTER — OFFICE VISIT (OUTPATIENT)
Dept: ORTHOPEDIC SURGERY | Age: 85
End: 2025-03-18
Payer: MEDICARE

## 2025-03-18 VITALS — WEIGHT: 180 LBS | RESPIRATION RATE: 18 BRPM | BODY MASS INDEX: 28.93 KG/M2 | HEIGHT: 66 IN

## 2025-03-18 DIAGNOSIS — S32.009D CLOSED FRACTURE OF TRANSVERSE PROCESS OF LUMBAR VERTEBRA WITH ROUTINE HEALING, SUBSEQUENT ENCOUNTER: Primary | ICD-10-CM

## 2025-03-18 PROCEDURE — 1159F MED LIST DOCD IN RCRD: CPT | Performed by: ORTHOPAEDIC SURGERY

## 2025-03-18 PROCEDURE — 99213 OFFICE O/P EST LOW 20 MIN: CPT | Performed by: ORTHOPAEDIC SURGERY

## 2025-03-18 PROCEDURE — 1123F ACP DISCUSS/DSCN MKR DOCD: CPT | Performed by: ORTHOPAEDIC SURGERY

## 2025-03-18 PROCEDURE — 1125F AMNT PAIN NOTED PAIN PRSNT: CPT | Performed by: ORTHOPAEDIC SURGERY

## 2025-03-18 NOTE — PROGRESS NOTES
Mental Status: Alert and oriented to person, place, and time.      Sensory: No sensory deficit.   Psychiatric:         Behavior: Behavior normal.         Thought Content: Thought content normal.    Scribe Attestation     By signing my name below, I, Chris Del Castillo, attest that this documentation has been prepared under the direction and in the presence of Dr. Javier Morris. Electronically signed: Gertrudis Fox     Physician Attestation    I, Javier Morris, personally performed the services described in this documentation. All medical record entries made by the scribe were at my direction and in my presence. I have reviewed the chart and discharge instructions and agree that the records reflect my personal performance and is accurate and complete. Javier Morris MD 3/18/25           Please note that this chart was generated using voice recognition Dragon dictation software.  Although every effort was made to ensure the accuracy of this automated transcription, some errors in transcription may have occurred.
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
## 2025-03-25 ENCOUNTER — OFFICE VISIT (OUTPATIENT)
Dept: UROLOGY | Age: 85
End: 2025-03-25
Payer: MEDICARE

## 2025-03-25 VITALS
HEART RATE: 82 BPM | BODY MASS INDEX: 28.89 KG/M2 | DIASTOLIC BLOOD PRESSURE: 77 MMHG | TEMPERATURE: 97 F | SYSTOLIC BLOOD PRESSURE: 131 MMHG | WEIGHT: 179 LBS

## 2025-03-25 DIAGNOSIS — C61 PROSTATE CANCER (HCC): Primary | ICD-10-CM

## 2025-03-25 PROCEDURE — 99213 OFFICE O/P EST LOW 20 MIN: CPT | Performed by: UROLOGY

## 2025-03-25 PROCEDURE — 1159F MED LIST DOCD IN RCRD: CPT | Performed by: UROLOGY

## 2025-03-25 PROCEDURE — 1123F ACP DISCUSS/DSCN MKR DOCD: CPT | Performed by: UROLOGY

## 2025-03-25 PROCEDURE — 96402 CHEMO HORMON ANTINEOPL SQ/IM: CPT | Performed by: UROLOGY

## 2025-03-25 ASSESSMENT — ENCOUNTER SYMPTOMS
EYE REDNESS: 0
SHORTNESS OF BREATH: 0
EYE PAIN: 0
ABDOMINAL PAIN: 0
VOMITING: 0
ALLERGIC/IMMUNOLOGIC NEGATIVE: 1
RESPIRATORY NEGATIVE: 1
NAUSEA: 0
COUGH: 0
GASTROINTESTINAL NEGATIVE: 1
EYES NEGATIVE: 1
BACK PAIN: 0
WHEEZING: 0
COLOR CHANGE: 0

## 2025-03-25 NOTE — PROGRESS NOTES
Dx: Prostate Cancer  After obtaining written and verbal consent, Eligard 45mg administered in Right Arm of abdomen by Estefania Fang RN by order of Dr Waldron. Patient was instructed to remain in clinic for 20 mins following injection and report any adverse reactions to me immediately. He tolerated the injection without any complications. We reviewed that the side effects include hot flashes, fatigue, breast enlargement, diminished libido, ED and long term development of osteoporosis.  The patient was told he will encouraged to take supplemental Calcium and Vitamin D to prevent the latter.   
Review of Systems   Constitutional: Negative.  Negative for appetite change, chills and fever.   HENT: Negative.     Eyes: Negative.  Negative for pain, redness and visual disturbance.   Respiratory: Negative.  Negative for cough, shortness of breath and wheezing.    Cardiovascular: Negative.  Negative for chest pain and leg swelling.   Gastrointestinal: Negative.  Negative for abdominal pain, nausea and vomiting.   Endocrine: Negative.    Genitourinary: Negative.  Negative for difficulty urinating, dysuria, flank pain, frequency, hematuria, testicular pain and urgency.   Musculoskeletal: Negative.  Negative for back pain, joint swelling and myalgias.   Skin: Negative.  Negative for color change, rash and wound.   Allergic/Immunologic: Negative.    Neurological: Negative.  Negative for dizziness, tremors, weakness, numbness and headaches.   Hematological: Negative.  Negative for adenopathy. Does not bruise/bleed easily.   Psychiatric/Behavioral: Negative.       
MD Carl   Medication Sig Dispense Refill    ferrous sulfate (IRON 325) 325 (65 Fe) MG tablet Take 1 tablet by mouth daily (with breakfast) 30 tablet 0    aspirin 81 MG EC tablet Take 1 tablet by mouth daily      Multiple Vitamin (MULTIVITAMIN PO) Take  by mouth daily.        Cholecalciferol (VITAMIN D) 2000 UNITS TABS Take  by mouth Daily.        simvastatin (ZOCOR) 40 MG tablet Take 1 tablet by mouth nightly         Patient has no known allergies.  Social History     Tobacco Use   Smoking Status Never   Smokeless Tobacco Never     (Ifpatient a smoker, smoking cessation counseling offered)    Social History     Substance and Sexual Activity   Alcohol Use Yes    Comment: occasionally       REVIEW OF SYSTEMS:  Review of Systems    Physical Exam:      Vitals:    03/25/25 1310   BP: 131/77   Pulse: 82   Temp: 97 °F (36.1 °C)     Body mass index is 28.89 kg/m².  Patient is a 84 y.o. male in no acute distress and alert and oriented to person, place and time.  Physical Exam  Constitutional: Patient in no acute distress.  Neuro: Alert and oriented to person, place and time.  Psych: Mood normal, affect normal  Skin: No rash noted  HEENT: Head: Normocephalic andatraumatic    Assessment and Plan      1. Prostate cancer (HCC)           Plan:    Assessment & Plan     Doing well   We discussed IADT, but he wants to continue with ADT every 6 months.   Eligard given today.   F/U in 6 months.     Return in about 6 months (around 9/25/2025) for Labs.    Prescriptions Ordered:  Orders Placed This Encounter   Medications    leuprolide acetate (6 Month) (ELIGARD) injection 45 mg     Orders Placed:  Orders Placed This Encounter   Procedures    THER/PROPH/DIAG INJECTION, SUBCUT/IM    PSA, Diagnostic     Standing Status:   Future     Expected Date:   8/21/2025     Expiration Date:   3/25/2026           Carl Pinedo MD    Agree with the ROS entered by the MA.

## 2025-04-15 ENCOUNTER — OFFICE VISIT (OUTPATIENT)
Dept: GASTROENTEROLOGY | Age: 85
End: 2025-04-15
Payer: MEDICARE

## 2025-04-15 ENCOUNTER — TELEPHONE (OUTPATIENT)
Dept: GASTROENTEROLOGY | Age: 85
End: 2025-04-15

## 2025-04-15 VITALS
DIASTOLIC BLOOD PRESSURE: 90 MMHG | SYSTOLIC BLOOD PRESSURE: 174 MMHG | WEIGHT: 181 LBS | HEIGHT: 66 IN | BODY MASS INDEX: 29.09 KG/M2 | HEART RATE: 79 BPM

## 2025-04-15 DIAGNOSIS — K25.4 CHRONIC GASTRIC ULCER WITH HEMORRHAGE: Primary | ICD-10-CM

## 2025-04-15 PROCEDURE — 1123F ACP DISCUSS/DSCN MKR DOCD: CPT | Performed by: INTERNAL MEDICINE

## 2025-04-15 PROCEDURE — 1159F MED LIST DOCD IN RCRD: CPT | Performed by: INTERNAL MEDICINE

## 2025-04-15 PROCEDURE — 99214 OFFICE O/P EST MOD 30 MIN: CPT | Performed by: INTERNAL MEDICINE

## 2025-04-15 PROCEDURE — 1126F AMNT PAIN NOTED NONE PRSNT: CPT | Performed by: INTERNAL MEDICINE

## 2025-04-15 RX ORDER — PANTOPRAZOLE SODIUM 40 MG/1
40 TABLET, DELAYED RELEASE ORAL
Qty: 90 TABLET | Refills: 1 | Status: SHIPPED | OUTPATIENT
Start: 2025-04-15

## 2025-04-15 NOTE — PROGRESS NOTES
GI CLINIC FOLLOW UP    INTERVAL HISTORY:   Unknown, Provider  No address on file    Chief Complaint   Patient presents with    Follow-up           HISTORY OF PRESENT ILLNESS: Mr.Charles JOSELO Ortiz is a 84 y.o. male , referred for evaluation of gastric ulcer.    He was admitted at [facility] in Feb 2025 with c/o- melena.  We did EGD which showed- Gastric ulcer ([place] class-III), biopsy obtained  Erosive gastritis; Duodenitis  He is on PPI and is doing well.    No c/o- nausea, vomiting, fever, loss of appetite, weight loss, bloating, bleeding [place], diarrhea, nocturnal diarrhea, tenesmus      Past Medical,Family, and Social History reviewed and does contribute to the patient presentingcondition.    I did review all the labs results available for the labs which were ordered by the primary care physician, and the other consultants, we search on [facility] at [facility] and all the available care everywhere epic    I did review all the imaging studies of the abdomen available on EMR, ordered by the primary care physician and the other consultant    I did review all the pathology from the biopsies done on the previous endoscopies    Patient's PMH/PSH,SH,PSYCH Hx, MEDs, ALLERGIES, and ROS were all reviewed and updated in the appropriate sections.    PAST MEDICAL HISTORY:  Past Medical History:   Diagnosis Date    Abdominal pain     Cancer (HCC)     Hx of prostate    Gastric ulcer     GERD (gastroesophageal reflux disease)        Past Surgical History:   Procedure Laterality Date    COLONOSCOPY  2010    per pt, \"some polyps\"    UPPER GASTROINTESTINAL ENDOSCOPY  6/27/11,  11/7/2011    UPPER GASTROINTESTINAL ENDOSCOPY N/A 2/23/2025    ESOPHAGOGASTRODUODENOSCOPY BIOPSY performed by Farhad Leija MD at [facility]       CURRENT MEDICATIONS:    Current Outpatient Medications:     ferrous sulfate (IRON 325) 325 (65 Fe) MG tablet, Take 1 tablet by mouth daily (with breakfast), Disp: 30 tablet, Rfl: 0    amLODIPine (NORVASC) 2.5 MG tablet,

## 2025-04-15 NOTE — TELEPHONE ENCOUNTER
TBS for EGD/ [facility].  Dx: Chronic gastric ulcer with hemorrhage.    Repeat EGD was ordered at office visit, patient declined. States he is feeling well and does not wish to have the procedure completed.

## 2025-05-01 ENCOUNTER — HOSPITAL ENCOUNTER (INPATIENT)
Age: 85
LOS: 1 days | Discharge: NURSING, REHAB OR LONG-TERM CARE | DRG: 640 | End: 2025-05-05
Attending: EMERGENCY MEDICINE | Admitting: INTERNAL MEDICINE
Payer: MEDICARE

## 2025-05-01 DIAGNOSIS — R53.1 GENERALIZED WEAKNESS: Primary | ICD-10-CM

## 2025-05-01 LAB
ALBUMIN SERPL-MCNC: 4 G/DL (ref 3.5–5.2)
ALP SERPL-CCNC: 68 U/L (ref 40–129)
ALT SERPL-CCNC: 18 U/L (ref 10–50)
ANION GAP SERPL CALCULATED.3IONS-SCNC: 13 MMOL/L (ref 9–16)
AST SERPL-CCNC: 29 U/L (ref 10–50)
BASOPHILS # BLD: 0 K/UL (ref 0–0.2)
BASOPHILS NFR BLD: 0 % (ref 0–2)
BILIRUB SERPL-MCNC: 0.4 MG/DL (ref 0–1.2)
BUN SERPL-MCNC: 26 MG/DL (ref 8–23)
CALCIUM SERPL-MCNC: 9.8 MG/DL (ref 8.6–10.4)
CHLORIDE SERPL-SCNC: 103 MMOL/L (ref 98–107)
CO2 SERPL-SCNC: 25 MMOL/L (ref 20–31)
CREAT SERPL-MCNC: 2.2 MG/DL (ref 0.7–1.2)
EOSINOPHIL # BLD: 0 K/UL (ref 0–0.4)
EOSINOPHILS RELATIVE PERCENT: 0 % (ref 0–4)
ERYTHROCYTE [DISTWIDTH] IN BLOOD BY AUTOMATED COUNT: 15.5 % (ref 11.5–14.9)
GFR, ESTIMATED: 28 ML/MIN/1.73M2
GLUCOSE SERPL-MCNC: 88 MG/DL (ref 74–99)
HCT VFR BLD AUTO: 34.3 % (ref 41–53)
HGB BLD-MCNC: 11.6 G/DL (ref 13.5–17.5)
LYMPHOCYTES NFR BLD: 0.9 K/UL (ref 1–4.8)
LYMPHOCYTES RELATIVE PERCENT: 8 % (ref 24–44)
MAGNESIUM SERPL-MCNC: 1.9 MG/DL (ref 1.6–2.4)
MCH RBC QN AUTO: 29.2 PG (ref 26–34)
MCHC RBC AUTO-ENTMCNC: 33.8 G/DL (ref 31–37)
MCV RBC AUTO: 86.5 FL (ref 80–100)
MONOCYTES NFR BLD: 0.5 K/UL (ref 0.1–1.3)
MONOCYTES NFR BLD: 5 % (ref 1–7)
NEUTROPHILS NFR BLD: 87 % (ref 36–66)
NEUTS SEG NFR BLD: 9.5 K/UL (ref 1.3–9.1)
PLATELET # BLD AUTO: 241 K/UL (ref 150–450)
PMV BLD AUTO: 7.9 FL (ref 6–12)
POTASSIUM SERPL-SCNC: 3.3 MMOL/L (ref 3.7–5.3)
PROT SERPL-MCNC: 6.4 G/DL (ref 6.6–8.7)
RBC # BLD AUTO: 3.97 M/UL (ref 4.5–5.9)
SODIUM SERPL-SCNC: 141 MMOL/L (ref 136–145)
TROPONIN I SERPL HS-MCNC: 57 NG/L (ref 0–22)
TROPONIN I SERPL HS-MCNC: 63 NG/L (ref 0–22)
WBC OTHER # BLD: 10.9 K/UL (ref 3.5–11)

## 2025-05-01 PROCEDURE — 85025 COMPLETE CBC W/AUTO DIFF WBC: CPT

## 2025-05-01 PROCEDURE — 2500000003 HC RX 250 WO HCPCS: Performed by: NURSE PRACTITIONER

## 2025-05-01 PROCEDURE — 80053 COMPREHEN METABOLIC PANEL: CPT

## 2025-05-01 PROCEDURE — 81001 URINALYSIS AUTO W/SCOPE: CPT

## 2025-05-01 PROCEDURE — 83735 ASSAY OF MAGNESIUM: CPT

## 2025-05-01 PROCEDURE — 36415 COLL VENOUS BLD VENIPUNCTURE: CPT

## 2025-05-01 PROCEDURE — 99285 EMERGENCY DEPT VISIT HI MDM: CPT

## 2025-05-01 PROCEDURE — G0378 HOSPITAL OBSERVATION PER HR: HCPCS

## 2025-05-01 PROCEDURE — 93005 ELECTROCARDIOGRAM TRACING: CPT | Performed by: EMERGENCY MEDICINE

## 2025-05-01 PROCEDURE — 84484 ASSAY OF TROPONIN QUANT: CPT

## 2025-05-01 RX ORDER — LISINOPRIL AND HYDROCHLOROTHIAZIDE 12.5; 2 MG/1; MG/1
1 TABLET ORAL DAILY
Status: DISCONTINUED | OUTPATIENT
Start: 2025-05-02 | End: 2025-05-01 | Stop reason: CLARIF

## 2025-05-01 RX ORDER — BISACODYL 10 MG
10 SUPPOSITORY, RECTAL RECTAL DAILY PRN
Status: DISCONTINUED | OUTPATIENT
Start: 2025-05-01 | End: 2025-05-05 | Stop reason: HOSPADM

## 2025-05-01 RX ORDER — SODIUM CHLORIDE 9 MG/ML
INJECTION, SOLUTION INTRAVENOUS PRN
Status: DISCONTINUED | OUTPATIENT
Start: 2025-05-01 | End: 2025-05-05 | Stop reason: HOSPADM

## 2025-05-01 RX ORDER — LISINOPRIL 20 MG/1
20 TABLET ORAL DAILY
Status: DISCONTINUED | OUTPATIENT
Start: 2025-05-02 | End: 2025-05-05 | Stop reason: HOSPADM

## 2025-05-01 RX ORDER — HYDROCHLOROTHIAZIDE 25 MG/1
12.5 TABLET ORAL DAILY
Status: DISCONTINUED | OUTPATIENT
Start: 2025-05-02 | End: 2025-05-02

## 2025-05-01 RX ORDER — PANTOPRAZOLE SODIUM 40 MG/1
40 TABLET, DELAYED RELEASE ORAL
Status: DISCONTINUED | OUTPATIENT
Start: 2025-05-02 | End: 2025-05-05 | Stop reason: HOSPADM

## 2025-05-01 RX ORDER — FERROUS SULFATE 325(65) MG
325 TABLET ORAL
Status: DISCONTINUED | OUTPATIENT
Start: 2025-05-02 | End: 2025-05-05 | Stop reason: HOSPADM

## 2025-05-01 RX ORDER — ACETAMINOPHEN 325 MG/1
650 TABLET ORAL EVERY 6 HOURS PRN
Status: DISCONTINUED | OUTPATIENT
Start: 2025-05-01 | End: 2025-05-05 | Stop reason: HOSPADM

## 2025-05-01 RX ORDER — ACETAMINOPHEN 650 MG/1
650 SUPPOSITORY RECTAL EVERY 6 HOURS PRN
Status: DISCONTINUED | OUTPATIENT
Start: 2025-05-01 | End: 2025-05-05 | Stop reason: HOSPADM

## 2025-05-01 RX ORDER — ASPIRIN 81 MG/1
81 TABLET ORAL DAILY
Status: DISCONTINUED | OUTPATIENT
Start: 2025-05-02 | End: 2025-05-02

## 2025-05-01 RX ORDER — ATORVASTATIN CALCIUM 20 MG/1
20 TABLET, FILM COATED ORAL DAILY
Status: DISCONTINUED | OUTPATIENT
Start: 2025-05-02 | End: 2025-05-05 | Stop reason: HOSPADM

## 2025-05-01 RX ORDER — SODIUM CHLORIDE 0.9 % (FLUSH) 0.9 %
10 SYRINGE (ML) INJECTION PRN
Status: DISCONTINUED | OUTPATIENT
Start: 2025-05-01 | End: 2025-05-05 | Stop reason: HOSPADM

## 2025-05-01 RX ORDER — HYDRALAZINE HYDROCHLORIDE 20 MG/ML
10 INJECTION INTRAMUSCULAR; INTRAVENOUS EVERY 6 HOURS PRN
Status: DISCONTINUED | OUTPATIENT
Start: 2025-05-01 | End: 2025-05-05 | Stop reason: HOSPADM

## 2025-05-01 RX ORDER — POLYETHYLENE GLYCOL 3350 17 G/17G
17 POWDER, FOR SOLUTION ORAL DAILY PRN
Status: DISCONTINUED | OUTPATIENT
Start: 2025-05-01 | End: 2025-05-05 | Stop reason: HOSPADM

## 2025-05-01 RX ORDER — SODIUM CHLORIDE 0.9 % (FLUSH) 0.9 %
5-40 SYRINGE (ML) INJECTION EVERY 12 HOURS SCHEDULED
Status: DISCONTINUED | OUTPATIENT
Start: 2025-05-01 | End: 2025-05-05 | Stop reason: HOSPADM

## 2025-05-01 RX ADMIN — SODIUM CHLORIDE, PRESERVATIVE FREE 10 ML: 5 INJECTION INTRAVENOUS at 23:15

## 2025-05-01 ASSESSMENT — PAIN - FUNCTIONAL ASSESSMENT: PAIN_FUNCTIONAL_ASSESSMENT: NONE - DENIES PAIN

## 2025-05-01 NOTE — ED PROVIDER NOTES
eMERGENCY dEPARTMENT eNCOUnter      Pt Name: Gustabo Ortiz  MRN: [id number]  Birthdate 1940  Date of evaluation: 5/1/25      CHIEF COMPLAINT       Chief Complaint   Patient presents with    Extremity Weakness     Lower extremity weakness, onset today around 2pm.  No other complaints at this time from pt.           HISTORY OF PRESENT ILLNESS    Gustabo Ortiz is a 84 y.o. male who presents complaining of extremity weakness.  Patient states that earlier today he was out doing some gardening stuff and was unable to get up EMS was called out they were able to get him up but he was not really able to walk with his walker at all.  Patient states he just has extreme weakness in his lower extremities.  Patient states they do not hurt he has no numbness or tingling.  Patient denies any recent illnesses vomiting cough fevers.  Patient has no prior history of strokes.      REVIEW OF SYSTEMS       Review of Systems   Constitutional:  Negative for activity change, appetite change, chills, diaphoresis and fever.   HENT:  Negative for congestion, ear pain, facial swelling, nosebleeds, rhinorrhea, sinus pressure, sore throat and trouble swallowing.    Eyes:  Negative for pain, discharge and redness.   Respiratory:  Negative for cough, chest tightness, shortness of breath and wheezing.    Cardiovascular:  Negative for chest pain, palpitations and leg swelling.   Gastrointestinal:  Negative for abdominal pain, blood in stool, constipation, diarrhea, nausea and vomiting.   Genitourinary:  Negative for difficulty urinating, dysuria, flank pain, frequency, genital sores and hematuria.   Musculoskeletal:  Negative for arthralgias, back pain, gait problem, joint swelling, myalgias and neck pain.   Skin:  Negative for color change, pallor, rash and wound.   Neurological:  Positive for weakness. Negative for dizziness, tremors, seizures, syncope, speech difficulty, numbness and headaches.   Psychiatric/Behavioral:  Negative for

## 2025-05-02 LAB
ANION GAP SERPL CALCULATED.3IONS-SCNC: 12 MMOL/L (ref 9–16)
BACTERIA URNS QL MICRO: ABNORMAL
BASOPHILS # BLD: 0.1 K/UL (ref 0–0.2)
BASOPHILS NFR BLD: 1 % (ref 0–2)
BILIRUB UR QL STRIP: NEGATIVE
BNP SERPL-MCNC: 1870 PG/ML (ref 0–300)
BUN SERPL-MCNC: 22 MG/DL (ref 8–23)
CALCIUM SERPL-MCNC: 9.1 MG/DL (ref 8.6–10.4)
CASTS #/AREA URNS LPF: ABNORMAL /LPF
CHLORIDE SERPL-SCNC: 101 MMOL/L (ref 98–107)
CLARITY UR: CLEAR
CO2 SERPL-SCNC: 26 MMOL/L (ref 20–31)
COLOR UR: YELLOW
CREAT SERPL-MCNC: 1.9 MG/DL (ref 0.7–1.2)
EKG Q-T INTERVAL: 416 MS
EKG QRS DURATION: 90 MS
EKG QTC CALCULATION (BAZETT): 495 MS
EKG R AXIS: -40 DEGREES
EKG T AXIS: 19 DEGREES
EKG VENTRICULAR RATE: 85 BPM
EOSINOPHIL # BLD: 0.2 K/UL (ref 0–0.4)
EOSINOPHILS RELATIVE PERCENT: 2 % (ref 0–4)
EPI CELLS #/AREA URNS HPF: ABNORMAL /HPF
ERYTHROCYTE [DISTWIDTH] IN BLOOD BY AUTOMATED COUNT: 15.3 % (ref 11.5–14.9)
GFR, ESTIMATED: 34 ML/MIN/1.73M2
GLUCOSE SERPL-MCNC: 83 MG/DL (ref 74–99)
GLUCOSE UR STRIP-MCNC: NEGATIVE MG/DL
HCT VFR BLD AUTO: 31.8 % (ref 41–53)
HGB BLD-MCNC: 10.7 G/DL (ref 13.5–17.5)
HGB UR QL STRIP.AUTO: NEGATIVE
KETONES UR STRIP-MCNC: ABNORMAL MG/DL
LEUKOCYTE ESTERASE UR QL STRIP: ABNORMAL
LYMPHOCYTES NFR BLD: 1.6 K/UL (ref 1–4.8)
LYMPHOCYTES RELATIVE PERCENT: 22 % (ref 24–44)
MAGNESIUM SERPL-MCNC: 2 MG/DL (ref 1.6–2.4)
MCH RBC QN AUTO: 29.1 PG (ref 26–34)
MCHC RBC AUTO-ENTMCNC: 33.7 G/DL (ref 31–37)
MCV RBC AUTO: 86.3 FL (ref 80–100)
MONOCYTES NFR BLD: 0.5 K/UL (ref 0.1–1.3)
MONOCYTES NFR BLD: 6 % (ref 1–7)
NEUTROPHILS NFR BLD: 69 % (ref 36–66)
NEUTS SEG NFR BLD: 4.9 K/UL (ref 1.3–9.1)
NITRITE UR QL STRIP: NEGATIVE
PH UR STRIP: 5 [PH] (ref 5–8)
PLATELET # BLD AUTO: 200 K/UL (ref 150–450)
PMV BLD AUTO: 8.1 FL (ref 6–12)
POTASSIUM SERPL-SCNC: 2.8 MMOL/L (ref 3.7–5.3)
PROT UR STRIP-MCNC: NEGATIVE MG/DL
RBC # BLD AUTO: 3.68 M/UL (ref 4.5–5.9)
RBC #/AREA URNS HPF: ABNORMAL /HPF
SODIUM SERPL-SCNC: 139 MMOL/L (ref 136–145)
SP GR UR STRIP: 1.01 (ref 1–1.03)
UROBILINOGEN UR STRIP-ACNC: NORMAL EU/DL (ref 0–1)
WBC #/AREA URNS HPF: ABNORMAL /HPF
WBC OTHER # BLD: 7.1 K/UL (ref 3.5–11)

## 2025-05-02 PROCEDURE — 2500000003 HC RX 250 WO HCPCS: Performed by: NURSE PRACTITIONER

## 2025-05-02 PROCEDURE — 36415 COLL VENOUS BLD VENIPUNCTURE: CPT

## 2025-05-02 PROCEDURE — 97166 OT EVAL MOD COMPLEX 45 MIN: CPT

## 2025-05-02 PROCEDURE — 83735 ASSAY OF MAGNESIUM: CPT

## 2025-05-02 PROCEDURE — 83880 ASSAY OF NATRIURETIC PEPTIDE: CPT

## 2025-05-02 PROCEDURE — G0378 HOSPITAL OBSERVATION PER HR: HCPCS

## 2025-05-02 PROCEDURE — 6370000000 HC RX 637 (ALT 250 FOR IP): Performed by: NURSE PRACTITIONER

## 2025-05-02 PROCEDURE — 6370000000 HC RX 637 (ALT 250 FOR IP): Performed by: INTERNAL MEDICINE

## 2025-05-02 PROCEDURE — 97530 THERAPEUTIC ACTIVITIES: CPT

## 2025-05-02 PROCEDURE — 80048 BASIC METABOLIC PNL TOTAL CA: CPT

## 2025-05-02 PROCEDURE — 93010 ELECTROCARDIOGRAM REPORT: CPT | Performed by: INTERNAL MEDICINE

## 2025-05-02 PROCEDURE — 85025 COMPLETE CBC W/AUTO DIFF WBC: CPT

## 2025-05-02 PROCEDURE — 99223 1ST HOSP IP/OBS HIGH 75: CPT | Performed by: INTERNAL MEDICINE

## 2025-05-02 RX ORDER — SPIRONOLACTONE 25 MG/1
25 TABLET ORAL DAILY
Status: DISCONTINUED | OUTPATIENT
Start: 2025-05-02 | End: 2025-05-05 | Stop reason: HOSPADM

## 2025-05-02 RX ORDER — POTASSIUM CHLORIDE 1500 MG/1
40 TABLET, EXTENDED RELEASE ORAL ONCE
Status: COMPLETED | OUTPATIENT
Start: 2025-05-02 | End: 2025-05-02

## 2025-05-02 RX ADMIN — PANTOPRAZOLE SODIUM 40 MG: 40 TABLET, DELAYED RELEASE ORAL at 06:14

## 2025-05-02 RX ADMIN — ASPIRIN 81 MG: 81 TABLET, COATED ORAL at 08:10

## 2025-05-02 RX ADMIN — Medication 325 MG: at 08:10

## 2025-05-02 RX ADMIN — SODIUM CHLORIDE, PRESERVATIVE FREE 10 ML: 5 INJECTION INTRAVENOUS at 08:11

## 2025-05-02 RX ADMIN — ATORVASTATIN CALCIUM 20 MG: 20 TABLET, FILM COATED ORAL at 08:10

## 2025-05-02 RX ADMIN — LISINOPRIL 20 MG: 20 TABLET ORAL at 08:10

## 2025-05-02 RX ADMIN — POTASSIUM CHLORIDE 40 MEQ: 1500 TABLET, EXTENDED RELEASE ORAL at 09:23

## 2025-05-02 RX ADMIN — HYDROCHLOROTHIAZIDE 12.5 MG: 25 TABLET ORAL at 08:10

## 2025-05-02 RX ADMIN — SPIRONOLACTONE 25 MG: 25 TABLET ORAL at 14:05

## 2025-05-02 RX ADMIN — SODIUM CHLORIDE, PRESERVATIVE FREE 10 ML: 5 INJECTION INTRAVENOUS at 20:13

## 2025-05-02 RX ADMIN — PANTOPRAZOLE SODIUM 40 MG: 40 TABLET, DELAYED RELEASE ORAL at 16:12

## 2025-05-02 NOTE — CARE COORDINATION
DISCHARGE PLANNING NOTE:    [facility] is unable to accept due to cost of care.    Electronically signed by Alberta Bryan RN on 5/2/2025 at 3:10 PM

## 2025-05-02 NOTE — PLAN OF CARE
Problem: Discharge Planning  Goal: Discharge to home or other facility with appropriate resources  Outcome: Progressing  Flowsheets (Taken 5/1/2025 2210)  Discharge to home or other facility with appropriate resources: Identify barriers to discharge with patient and caregiver     Problem: ABCDS Injury Assessment  Goal: Absence of physical injury  Outcome: Progressing  Flowsheets (Taken 5/1/2025 2215)  Absence of Physical Injury: Implement safety measures based on patient assessment     Problem: Safety - Adult  Goal: Free from fall injury  Outcome: Progressing     Problem: Musculoskeletal - Adult  Goal: Return mobility to safest level of function  Outcome: Progressing

## 2025-05-02 NOTE — ACP (ADVANCE CARE PLANNING)
Advance Care Planning     Advance Care Planning Activator (Inpatient)  Conversation Note      Date of ACP Conversation: 5/2/2025     Conversation Conducted with: Patient with Decision Making Capacity    ACP Activator: Alberta Bryan RN    Health Care Decision Maker:     Current Designated Health Care Decision Maker:     Primary Decision Maker: Gustabo Ortiz Jr - Child - [phone]    Primary Decision Maker: krunal browning - Child - [phone]  Click here to complete Healthcare Decision Makers including section of the Healthcare Decision Maker Relationship (ie \"Primary\")  Today we documented Decision Maker(s) consistent with Legal Next of Kin hierarchy.    Care Preferences    Ventilation:  \"If you were in your present state of health and suddenly became very ill and were unable to breathe on your own, what would your preference be about the use of a ventilator (breathing machine) if it were available to you?\"      Would the patient desire the use of ventilator (breathing machine)?: yes    \"If your health worsens and it becomes clear that your chance of recovery is unlikely, what would your preference be about the use of a ventilator (breathing machine) if it were available to you?\"     Would the patient desire the use of ventilator (breathing machine)?: Yes      Resuscitation  \"CPR works best to restart the heart when there is a sudden event, like a heart attack, in someone who is otherwise healthy. Unfortunately, CPR does not typically restart the heart for people who have serious health conditions or who are very sick.\"    \"In the event your heart stopped as a result of an underlying serious health condition, would you want attempts to be made to restart your heart (answer \"yes\" for attempt to resuscitate) or would you prefer a natural death (answer \"no\" for do not attempt to resuscitate)?\" yes       [] Yes   [] No   Educated Patient / Decision Maker regarding differences between Advance Directives and

## 2025-05-02 NOTE — CARE COORDINATION
Case Management Assessment  Initial Evaluation    Date/Time of Evaluation: 5/2/2025 11:10AM  Assessment Completed by: Alberta Bryan RN    If patient is discharged prior to next notation, then this note serves as note for discharge by case management.    Patient Name: Gustabo Ortiz                   YOB: 1940  Diagnosis: Weakness [R53.1]  Generalized weakness [R53.1]                   Date / Time: 5/1/2025  5:39 PM    Patient Admission Status: Observation   Readmission Risk (Low < 19, Mod (19-27), High > 27): Readmission Risk Score: 13.8    Current PCP: Daniel Perez  PCP verified by CM? Yes    Chart Reviewed: Yes      History Provided by: Patient  Patient Orientation: Alert and Oriented    Patient Cognition: Alert    Hospitalization in the last 30 days (Readmission):  No    If yes, Readmission Assessment in CM Navigator will be completed.    Advance Directives:      Code Status: Full Code   Patient's Primary Decision Maker is: Legal Next of Kin    Primary Decision Maker: Gustabo Ortiz Jr  Child - [phone]    Primary Decision Maker: krunal browning  Child - [phone]    Discharge Planning:    Patient lives with: Alone Type of Home: Trailer/Mobile Home  Primary Care Giver: Self  Patient Support Systems include: Family Members, Children   Current Financial resources: Medicare  Current community resources: None  Current services prior to admission: Durable Medical Equipment            Current DME: Shower Chair, Walker            Type of Home Care services:  None    ADLS  Prior functional level: Independent in ADLs/IADLs  Current functional level: Assistance with the following:, Mobility, Shopping, Housework, Cooking, Bathing, Dressing, Toileting    PT AM-PAC:   /24  OT AM-PAC:   /24    Family can provide assistance at DC: No  Would you like Case Management to discuss the discharge plan with any other family members/significant others, and if so, who? No  Plans to Return to Present

## 2025-05-02 NOTE — H&P
[facility] Internal Medicine   Justyn Fatima MD; MD Frieda Chang MD, MD , Dewey Jeong MD    [facility] Internal Medicine   IN-PATIENT SERVICE   [facility]    HISTORY AND PHYSICAL EXAMINATION            Date:   5/2/2025  Patient name:  Gustabo Ortiz  Date of admission:  5/1/2025  5:39 PM  MRN:   [id number]  Account:  [id number]  YOB: 1940  PCP:    Daniel Perez  Room:   2073/2073-01  Code Status:    Full Code    Chief Complaint:     Chief Complaint   Patient presents with    Extremity Weakness     Lower extremity weakness, onset today around 2pm.  No other complaints at this time from pt.         History Obtained From:   Patient medical record nursing staff      History of Present Illness:     Gustabo Ortiz is a 84 y.o. Non- / non  male who presents with Extremity Weakness (Lower extremity weakness, onset today around 2pm.  No other complaints at this time from pt./)   and is admitted to the hospital for the management of Weakness.    Gustabo Ortiz is a 84 y.o. Non- / non  male who presents with Extremity Weakness (Lower extremity weakness, onset today around 2pm.  No other complaints at this time from pt./)   and is admitted to the hospital for the management of Weakness.  Medical history significant for atrial fibrillation not on anticoagulation, HTN, HLD, CKD, Gastric ulcer, history of prostate cancer.      Presented to the ED per EMS after being called out 3 times for patient needing assistance due to increased weakness.  Typically patient walks with a walker independently and today is reporting increased lower extremity weakness. He does state that he was working in the garden earlier this morning and was kneeling on the ground for a short period then had problems returning to a standing with his walker. States that his legs \"just aren't moving right and can't stand\".

## 2025-05-02 NOTE — PLAN OF CARE
Problem: Discharge Planning  Goal: Discharge to home or other facility with appropriate resources  Outcome: Progressing     Problem: ABCDS Injury Assessment  Goal: Absence of physical injury  Outcome: Progressing     Problem: Safety - Adult  Goal: Free from fall injury  Outcome: Progressing     Problem: Musculoskeletal - Adult  Goal: Return mobility to safest level of function  Outcome: Progressing     Problem: Skin/Tissue Integrity  Goal: Skin integrity remains intact  Description: 1.  Monitor for areas of redness and/or skin breakdown2.  Assess vascular access sites hourly3.  Every 4-6 hours minimum:  Change oxygen saturation probe site4.  Every 4-6 hours:  If on nasal continuous positive airway pressure, respiratory therapy assess nares and determine need for appliance change or resting period  Outcome: Progressing

## 2025-05-02 NOTE — ED NOTES
Report given to MINAL Currie from [facility].   Report method by phone.   The following was reviewed with receiving RN:   Current vital signs:  /70   Pulse 73   Temp 98.1 °F (36.7 °C) (Oral)   Resp 22   Ht 1.676 m (5' 6\")   Wt 83.9 kg (185 lb)   SpO2 97%   BMI 29.86 kg/m²                MEWS Score: 2     Any medication or safety alerts were reviewed. Any pending diagnostics and notifications were also reviewed, as well as any safety concerns or issues, abnormal labs, abnormal imaging, and abnormal assessment findings. Questions were answered.

## 2025-05-02 NOTE — CONSULTS
Date:   5/2/2025  Patient name: Gustabo Ortiz  Date of admission:  5/1/2025  5:39 PM  MRN:   [id number]  YOB: 1940  PCP: Daniel Perez    Reason for Admission: Weakness [R53.1]  Generalized weakness [R53.1]    Cardiology consult: A Fib       Referring physician: Dr Justyn Fatima    Impression    Admission 5/1/2025 extreme weakness lower extremities unable to walk or stand  High-sensitivity troponin 63 and 57 with downward trend, hypokalemia on admission potassium 3.3 then dropped to 2.8  No ischemic ECG changes  A-fib heart rate under control  Normal LV systolic function ejection fraction 60% mildly dilated LA normal RV size and function RVSP 33  Admission 2/21/2025 with a GI bleed, melanotic stool, hemoglobin 8.7  EGD 2/23/2025 showed gastric ulcer 1.3 cm gastritis and duodenitis  Iron deficiency   Normal B12 665 ng/ml and folate >40 ng/ml  History of gastric ulcer, had GI bleed 15 years ago, gastroesophageal reflux disease  Hypertension  Chronic kidney disease on admission creatinine 2.6 BUN 58 significant improvement on 2/24/2025 BUN 20 creatinine 1.6  Left carotid bruit diminished carotid upstroke  History of prostate cancer  Severe lumbar spine degenerative changes L2-L5 spinal canal stenosis  Limited right shoulder movement  History of poor balance, falls      History of present illness  84-year-old male retired  who lives alone, still drives who has past medical history of hypertension, hyperlipidemia, prostate cancer, previous history of peptic ulcer disease, GI bleed about 15 years ago who has been on Prilosec got hospitalized with extreme  weakness lower extremities.  Patient states that he was doing his backyard work putting some bricks around the flower plants and he was on his knees and he felt tired, he got up and walked with his walker and lasted about 15 minutes and then when he tried to stand up again he could not to stand and he fell down on the concrete.  He

## 2025-05-03 ENCOUNTER — APPOINTMENT (OUTPATIENT)
Dept: GENERAL RADIOLOGY | Age: 85
DRG: 640 | End: 2025-05-03
Payer: MEDICARE

## 2025-05-03 LAB
25(OH)D3 SERPL-MCNC: 72.9 NG/ML (ref 30–100)
ANION GAP SERPL CALCULATED.3IONS-SCNC: 9 MMOL/L (ref 9–16)
BASOPHILS # BLD: 0 K/UL (ref 0–0.2)
BASOPHILS NFR BLD: 0 % (ref 0–2)
BUN SERPL-MCNC: 17 MG/DL (ref 8–23)
CALCIUM SERPL-MCNC: 9.1 MG/DL (ref 8.6–10.4)
CHLORIDE SERPL-SCNC: 101 MMOL/L (ref 98–107)
CO2 SERPL-SCNC: 29 MMOL/L (ref 20–31)
CREAT SERPL-MCNC: 1.5 MG/DL (ref 0.7–1.2)
EOSINOPHIL # BLD: 0.3 K/UL (ref 0–0.4)
EOSINOPHILS RELATIVE PERCENT: 4 % (ref 0–4)
ERYTHROCYTE [DISTWIDTH] IN BLOOD BY AUTOMATED COUNT: 15.2 % (ref 11.5–14.9)
GFR, ESTIMATED: 45 ML/MIN/1.73M2
GLUCOSE SERPL-MCNC: 101 MG/DL (ref 74–99)
HCT VFR BLD AUTO: 34.4 % (ref 41–53)
HGB BLD-MCNC: 11.5 G/DL (ref 13.5–17.5)
LYMPHOCYTES NFR BLD: 1.7 K/UL (ref 1–4.8)
LYMPHOCYTES RELATIVE PERCENT: 26 % (ref 24–44)
MAGNESIUM SERPL-MCNC: 2.1 MG/DL (ref 1.6–2.4)
MCH RBC QN AUTO: 28.9 PG (ref 26–34)
MCHC RBC AUTO-ENTMCNC: 33.4 G/DL (ref 31–37)
MCV RBC AUTO: 86.6 FL (ref 80–100)
MONOCYTES NFR BLD: 0.5 K/UL (ref 0.1–1.3)
MONOCYTES NFR BLD: 7 % (ref 1–7)
NEUTROPHILS NFR BLD: 63 % (ref 36–66)
NEUTS SEG NFR BLD: 4 K/UL (ref 1.3–9.1)
PLATELET # BLD AUTO: 218 K/UL (ref 150–450)
PMV BLD AUTO: 7.7 FL (ref 6–12)
POTASSIUM SERPL-SCNC: 3.5 MMOL/L (ref 3.7–5.3)
RBC # BLD AUTO: 3.98 M/UL (ref 4.5–5.9)
SODIUM SERPL-SCNC: 139 MMOL/L (ref 136–145)
WBC OTHER # BLD: 6.4 K/UL (ref 3.5–11)

## 2025-05-03 PROCEDURE — 85025 COMPLETE CBC W/AUTO DIFF WBC: CPT

## 2025-05-03 PROCEDURE — 80048 BASIC METABOLIC PNL TOTAL CA: CPT

## 2025-05-03 PROCEDURE — 73562 X-RAY EXAM OF KNEE 3: CPT

## 2025-05-03 PROCEDURE — 82306 VITAMIN D 25 HYDROXY: CPT

## 2025-05-03 PROCEDURE — 36415 COLL VENOUS BLD VENIPUNCTURE: CPT

## 2025-05-03 PROCEDURE — G0378 HOSPITAL OBSERVATION PER HR: HCPCS

## 2025-05-03 PROCEDURE — 99232 SBSQ HOSP IP/OBS MODERATE 35: CPT | Performed by: INTERNAL MEDICINE

## 2025-05-03 PROCEDURE — 6370000000 HC RX 637 (ALT 250 FOR IP): Performed by: NURSE PRACTITIONER

## 2025-05-03 PROCEDURE — 6370000000 HC RX 637 (ALT 250 FOR IP): Performed by: INTERNAL MEDICINE

## 2025-05-03 PROCEDURE — 83735 ASSAY OF MAGNESIUM: CPT

## 2025-05-03 PROCEDURE — 2500000003 HC RX 250 WO HCPCS: Performed by: NURSE PRACTITIONER

## 2025-05-03 PROCEDURE — 96374 THER/PROPH/DIAG INJ IV PUSH: CPT

## 2025-05-03 PROCEDURE — 6360000002 HC RX W HCPCS: Performed by: INTERNAL MEDICINE

## 2025-05-03 PROCEDURE — 6360000002 HC RX W HCPCS: Performed by: NURSE PRACTITIONER

## 2025-05-03 PROCEDURE — 96372 THER/PROPH/DIAG INJ SC/IM: CPT

## 2025-05-03 RX ORDER — HEPARIN SODIUM 5000 [USP'U]/ML
5000 INJECTION, SOLUTION INTRAVENOUS; SUBCUTANEOUS EVERY 8 HOURS SCHEDULED
Status: DISCONTINUED | OUTPATIENT
Start: 2025-05-03 | End: 2025-05-05 | Stop reason: HOSPADM

## 2025-05-03 RX ORDER — POTASSIUM CHLORIDE 1500 MG/1
40 TABLET, EXTENDED RELEASE ORAL ONCE
Status: COMPLETED | OUTPATIENT
Start: 2025-05-03 | End: 2025-05-03

## 2025-05-03 RX ADMIN — HYDRALAZINE HYDROCHLORIDE 10 MG: 20 INJECTION INTRAMUSCULAR; INTRAVENOUS at 06:30

## 2025-05-03 RX ADMIN — POTASSIUM CHLORIDE 40 MEQ: 1500 TABLET, EXTENDED RELEASE ORAL at 12:36

## 2025-05-03 RX ADMIN — PANTOPRAZOLE SODIUM 40 MG: 40 TABLET, DELAYED RELEASE ORAL at 06:30

## 2025-05-03 RX ADMIN — Medication 325 MG: at 09:14

## 2025-05-03 RX ADMIN — HEPARIN SODIUM 5000 UNITS: 5000 INJECTION INTRAVENOUS; SUBCUTANEOUS at 13:29

## 2025-05-03 RX ADMIN — LISINOPRIL 20 MG: 20 TABLET ORAL at 09:14

## 2025-05-03 RX ADMIN — ATORVASTATIN CALCIUM 20 MG: 20 TABLET, FILM COATED ORAL at 09:14

## 2025-05-03 RX ADMIN — HEPARIN SODIUM 5000 UNITS: 5000 INJECTION INTRAVENOUS; SUBCUTANEOUS at 20:41

## 2025-05-03 RX ADMIN — SODIUM CHLORIDE, PRESERVATIVE FREE 10 ML: 5 INJECTION INTRAVENOUS at 09:16

## 2025-05-03 RX ADMIN — SODIUM CHLORIDE, PRESERVATIVE FREE 10 ML: 5 INJECTION INTRAVENOUS at 20:40

## 2025-05-03 RX ADMIN — PANTOPRAZOLE SODIUM 40 MG: 40 TABLET, DELAYED RELEASE ORAL at 15:52

## 2025-05-03 RX ADMIN — SPIRONOLACTONE 25 MG: 25 TABLET ORAL at 09:14

## 2025-05-03 NOTE — PLAN OF CARE
Problem: Discharge Planning  Goal: Discharge to home or other facility with appropriate resources  5/3/2025 0044 by Felix Cutler RN  Outcome: Progressing  Flowsheets (Taken 5/2/2025 1945)  Discharge to home or other facility with appropriate resources: Identify barriers to discharge with patient and caregiver  5/2/2025 1510 by Tamie Jimenez RN  Outcome: Progressing     Problem: ABCDS Injury Assessment  Goal: Absence of physical injury  5/3/2025 0044 by Felix Cutler RN  Outcome: Progressing  Flowsheets (Taken 5/2/2025 2229)  Absence of Physical Injury: Implement safety measures based on patient assessment  5/2/2025 1510 by Tamie Jimenez RN  Outcome: Progressing     Problem: Safety - Adult  Goal: Free from fall injury  5/3/2025 0044 by Felix Cutler RN  Outcome: Progressing  Flowsheets (Taken 5/2/2025 2229)  Free From Fall Injury: Instruct family/caregiver on patient safety  5/2/2025 1510 by Tamie Jimenez RN  Outcome: Progressing     Problem: Musculoskeletal - Adult  Goal: Return mobility to safest level of function  5/3/2025 0044 by Felix Cutler RN  Outcome: Progressing  5/2/2025 1510 by Tamie Jimenez RN  Outcome: Progressing     Problem: Skin/Tissue Integrity  Goal: Skin integrity remains intact  Description: 1.  Monitor for areas of redness and/or skin breakdown2.  Assess vascular access sites hourly3.  Every 4-6 hours minimum:  Change oxygen saturation probe site4.  Every 4-6 hours:  If on nasal continuous positive airway pressure, respiratory therapy assess nares and determine need for appliance change or resting period  5/3/2025 0044 by Felix Cutler RN  Outcome: Progressing  Flowsheets  Taken 5/2/2025 2229  Skin Integrity Remains Intact: Monitor for areas of redness and/or skin breakdown  Taken 5/2/2025 1945  Skin Integrity Remains Intact: Monitor for areas of redness and/or skin breakdown  5/2/2025 1510 by Tamie Jimenez RN  Outcome: Progressing

## 2025-05-03 NOTE — CARE COORDINATION
DISCHARGE PLANNING NOTE:    Writer is following for potential discharge placement. Call placed to Snehal with [facility], provided pt information for referral review. Referral failed when sent 5/2.     Will place call back to writer for determination.    Electronically signed by TIM Bullard on 5/3/2025 at 9:25 AM

## 2025-05-03 NOTE — CARE COORDINATION
DISCHARGE PLANNING NOTE:    Message received from bedside RN Tamie that pt wishes to admit to [facility] instead of [facility]. Referral sent, Gustabo will review tomorrow morning.    Electronically signed by TIM Bullard on 5/3/2025 at 4:26 PM

## 2025-05-03 NOTE — CARE COORDINATION
DISCHARGE PLANNING NOTE:    Message received from Snehal with [facility], facility accepts this pt. Writer met with pt for update, pt agreeable to facility. Writer provided pt with location of facility for review.    Message placed to Snehal with [facility] for update. Pt will require authorization for admission.    Electronically signed by TIM Bullard on 5/3/2025 at 11:13 AM

## 2025-05-04 LAB
ANION GAP SERPL CALCULATED.3IONS-SCNC: 9 MMOL/L (ref 9–16)
BASOPHILS # BLD: 0 K/UL (ref 0–0.2)
BASOPHILS NFR BLD: 0 % (ref 0–2)
BUN SERPL-MCNC: 17 MG/DL (ref 8–23)
CALCIUM SERPL-MCNC: 9.4 MG/DL (ref 8.6–10.4)
CHLORIDE SERPL-SCNC: 102 MMOL/L (ref 98–107)
CO2 SERPL-SCNC: 28 MMOL/L (ref 20–31)
CREAT SERPL-MCNC: 1.5 MG/DL (ref 0.7–1.2)
EOSINOPHIL # BLD: 0.2 K/UL (ref 0–0.4)
EOSINOPHILS RELATIVE PERCENT: 3 % (ref 0–4)
ERYTHROCYTE [DISTWIDTH] IN BLOOD BY AUTOMATED COUNT: 15.2 % (ref 11.5–14.9)
GFR, ESTIMATED: 45 ML/MIN/1.73M2
GLUCOSE SERPL-MCNC: 105 MG/DL (ref 74–99)
HCT VFR BLD AUTO: 35.3 % (ref 41–53)
HGB BLD-MCNC: 11.9 G/DL (ref 13.5–17.5)
LYMPHOCYTES NFR BLD: 1.7 K/UL (ref 1–4.8)
LYMPHOCYTES RELATIVE PERCENT: 25 % (ref 24–44)
MCH RBC QN AUTO: 29.1 PG (ref 26–34)
MCHC RBC AUTO-ENTMCNC: 33.6 G/DL (ref 31–37)
MCV RBC AUTO: 86.5 FL (ref 80–100)
MONOCYTES NFR BLD: 0.5 K/UL (ref 0.1–1.3)
MONOCYTES NFR BLD: 7 % (ref 1–7)
NEUTROPHILS NFR BLD: 65 % (ref 36–66)
NEUTS SEG NFR BLD: 4.2 K/UL (ref 1.3–9.1)
PLATELET # BLD AUTO: 220 K/UL (ref 150–450)
PMV BLD AUTO: 8.2 FL (ref 6–12)
POTASSIUM SERPL-SCNC: 4 MMOL/L (ref 3.7–5.3)
RBC # BLD AUTO: 4.09 M/UL (ref 4.5–5.9)
SODIUM SERPL-SCNC: 139 MMOL/L (ref 136–145)
WBC OTHER # BLD: 6.6 K/UL (ref 3.5–11)

## 2025-05-04 PROCEDURE — G0378 HOSPITAL OBSERVATION PER HR: HCPCS

## 2025-05-04 PROCEDURE — 6370000000 HC RX 637 (ALT 250 FOR IP): Performed by: INTERNAL MEDICINE

## 2025-05-04 PROCEDURE — 97162 PT EVAL MOD COMPLEX 30 MIN: CPT

## 2025-05-04 PROCEDURE — 97535 SELF CARE MNGMENT TRAINING: CPT

## 2025-05-04 PROCEDURE — 6370000000 HC RX 637 (ALT 250 FOR IP): Performed by: NURSE PRACTITIONER

## 2025-05-04 PROCEDURE — 96372 THER/PROPH/DIAG INJ SC/IM: CPT

## 2025-05-04 PROCEDURE — 6360000002 HC RX W HCPCS: Performed by: INTERNAL MEDICINE

## 2025-05-04 PROCEDURE — 85025 COMPLETE CBC W/AUTO DIFF WBC: CPT

## 2025-05-04 PROCEDURE — 2500000003 HC RX 250 WO HCPCS: Performed by: NURSE PRACTITIONER

## 2025-05-04 PROCEDURE — 36415 COLL VENOUS BLD VENIPUNCTURE: CPT

## 2025-05-04 PROCEDURE — 99233 SBSQ HOSP IP/OBS HIGH 50: CPT | Performed by: INTERNAL MEDICINE

## 2025-05-04 PROCEDURE — 80048 BASIC METABOLIC PNL TOTAL CA: CPT

## 2025-05-04 PROCEDURE — 97530 THERAPEUTIC ACTIVITIES: CPT

## 2025-05-04 RX ADMIN — HEPARIN SODIUM 5000 UNITS: 5000 INJECTION INTRAVENOUS; SUBCUTANEOUS at 14:26

## 2025-05-04 RX ADMIN — LISINOPRIL 20 MG: 20 TABLET ORAL at 08:33

## 2025-05-04 RX ADMIN — Medication 325 MG: at 08:33

## 2025-05-04 RX ADMIN — SODIUM CHLORIDE, PRESERVATIVE FREE 10 ML: 5 INJECTION INTRAVENOUS at 20:43

## 2025-05-04 RX ADMIN — PANTOPRAZOLE SODIUM 40 MG: 40 TABLET, DELAYED RELEASE ORAL at 05:41

## 2025-05-04 RX ADMIN — SODIUM CHLORIDE, PRESERVATIVE FREE 10 ML: 5 INJECTION INTRAVENOUS at 08:34

## 2025-05-04 RX ADMIN — HEPARIN SODIUM 5000 UNITS: 5000 INJECTION INTRAVENOUS; SUBCUTANEOUS at 20:43

## 2025-05-04 RX ADMIN — SPIRONOLACTONE 25 MG: 25 TABLET ORAL at 08:33

## 2025-05-04 RX ADMIN — ATORVASTATIN CALCIUM 20 MG: 20 TABLET, FILM COATED ORAL at 08:33

## 2025-05-04 RX ADMIN — PANTOPRAZOLE SODIUM 40 MG: 40 TABLET, DELAYED RELEASE ORAL at 16:19

## 2025-05-04 RX ADMIN — HEPARIN SODIUM 5000 UNITS: 5000 INJECTION INTRAVENOUS; SUBCUTANEOUS at 05:41

## 2025-05-04 NOTE — PLAN OF CARE
Problem: Discharge Planning  Goal: Discharge to home or other facility with appropriate resources  5/3/2025 2313 by Felix Cutler RN  Outcome: Progressing  Flowsheets (Taken 5/3/2025 1945)  Discharge to home or other facility with appropriate resources:   Identify barriers to discharge with patient and caregiver   Identify discharge learning needs (meds, wound care, etc)  5/3/2025 1515 by Tamie Jimenez RN  Outcome: Progressing     Problem: ABCDS Injury Assessment  Goal: Absence of physical injury  5/3/2025 2313 by Felix Cutler RN  Outcome: Progressing  Flowsheets (Taken 5/3/2025 2309)  Absence of Physical Injury: Implement safety measures based on patient assessment  5/3/2025 1515 by Tamie Jimenez RN  Outcome: Progressing     Problem: Safety - Adult  Goal: Free from fall injury  5/3/2025 2313 by Felix Cutler RN  Outcome: Progressing  Flowsheets (Taken 5/3/2025 2309)  Free From Fall Injury: Instruct family/caregiver on patient safety  5/3/2025 1515 by Tamie Jimenez RN  Outcome: Progressing     Problem: Musculoskeletal - Adult  Goal: Return mobility to safest level of function  5/3/2025 2313 by Felix Cutler RN  Outcome: Progressing  5/3/2025 1515 by Tamie Jimenez RN  Outcome: Progressing     Problem: Skin/Tissue Integrity  Goal: Skin integrity remains intact  Description: 1.  Monitor for areas of redness and/or skin breakdown2.  Assess vascular access sites hourly3.  Every 4-6 hours minimum:  Change oxygen saturation probe site4.  Every 4-6 hours:  If on nasal continuous positive airway pressure, respiratory therapy assess nares and determine need for appliance change or resting period  5/3/2025 2313 by Felix Cutler RN  Outcome: Progressing  Flowsheets  Taken 5/3/2025 2309  Skin Integrity Remains Intact: Monitor for areas of redness and/or skin breakdown  Taken 5/3/2025 1945  Skin Integrity Remains Intact: Monitor for areas of redness and/or skin breakdown  5/3/2025 1515 by Tamie Jimenez

## 2025-05-04 NOTE — CARE COORDINATION
DISCHARGE PLANNING NOTE:    Writer is following for potential discharge placement. Message received from Gustaob with [facility], [facility] accepts this pt. Writer met with pt for update on acceptance and authorization needs. All questions answered at this time.    Electronically signed by TIM Bullard on 5/4/2025 at 12:48 PM

## 2025-05-04 NOTE — CARE COORDINATION
DISCHARGE PLANNING NOTE:    Authorization submitted through [employer] certification # [id number]    Electronically signed by TIM Bullard on 5/4/2025 at 4:57 PM

## 2025-05-05 ENCOUNTER — TELEPHONE (OUTPATIENT)
Dept: GASTROENTEROLOGY | Age: 85
End: 2025-05-05

## 2025-05-05 VITALS
HEART RATE: 76 BPM | BODY MASS INDEX: 29.73 KG/M2 | DIASTOLIC BLOOD PRESSURE: 75 MMHG | WEIGHT: 184.97 LBS | HEIGHT: 66 IN | OXYGEN SATURATION: 93 % | RESPIRATION RATE: 16 BRPM | TEMPERATURE: 97.8 F | SYSTOLIC BLOOD PRESSURE: 150 MMHG

## 2025-05-05 PROBLEM — E87.6 HYPOKALEMIA: Status: ACTIVE | Noted: 2025-05-05

## 2025-05-05 PROCEDURE — 96372 THER/PROPH/DIAG INJ SC/IM: CPT

## 2025-05-05 PROCEDURE — 6370000000 HC RX 637 (ALT 250 FOR IP): Performed by: INTERNAL MEDICINE

## 2025-05-05 PROCEDURE — 97535 SELF CARE MNGMENT TRAINING: CPT

## 2025-05-05 PROCEDURE — G0378 HOSPITAL OBSERVATION PER HR: HCPCS

## 2025-05-05 PROCEDURE — 6370000000 HC RX 637 (ALT 250 FOR IP): Performed by: NURSE PRACTITIONER

## 2025-05-05 PROCEDURE — 6360000002 HC RX W HCPCS: Performed by: INTERNAL MEDICINE

## 2025-05-05 PROCEDURE — 99239 HOSP IP/OBS DSCHRG MGMT >30: CPT | Performed by: INTERNAL MEDICINE

## 2025-05-05 PROCEDURE — 97110 THERAPEUTIC EXERCISES: CPT

## 2025-05-05 PROCEDURE — 2500000003 HC RX 250 WO HCPCS: Performed by: NURSE PRACTITIONER

## 2025-05-05 RX ORDER — SPIRONOLACTONE 25 MG/1
25 TABLET ORAL DAILY
Qty: 30 TABLET | Refills: 3 | Status: SHIPPED | OUTPATIENT
Start: 2025-05-06

## 2025-05-05 RX ORDER — LISINOPRIL 20 MG/1
20 TABLET ORAL DAILY
Qty: 30 TABLET | Refills: 3 | Status: SHIPPED | OUTPATIENT
Start: 2025-05-06

## 2025-05-05 RX ADMIN — ATORVASTATIN CALCIUM 20 MG: 20 TABLET, FILM COATED ORAL at 08:53

## 2025-05-05 RX ADMIN — Medication 325 MG: at 08:53

## 2025-05-05 RX ADMIN — HEPARIN SODIUM 5000 UNITS: 5000 INJECTION INTRAVENOUS; SUBCUTANEOUS at 14:04

## 2025-05-05 RX ADMIN — PANTOPRAZOLE SODIUM 40 MG: 40 TABLET, DELAYED RELEASE ORAL at 06:02

## 2025-05-05 RX ADMIN — LISINOPRIL 20 MG: 20 TABLET ORAL at 08:53

## 2025-05-05 RX ADMIN — SPIRONOLACTONE 25 MG: 25 TABLET ORAL at 08:53

## 2025-05-05 RX ADMIN — HEPARIN SODIUM 5000 UNITS: 5000 INJECTION INTRAVENOUS; SUBCUTANEOUS at 06:01

## 2025-05-05 RX ADMIN — SODIUM CHLORIDE, PRESERVATIVE FREE 10 ML: 5 INJECTION INTRAVENOUS at 08:57

## 2025-05-05 NOTE — CARE COORDINATION
HENS COMPLETE  Document ID : [id number]  Electronically signed by TIM Bullard on 5/5/2025 at 2:20 PM

## 2025-05-05 NOTE — CARE COORDINATION
DISCHARGE PLANNING NOTE:    Writer met with pt for update on pending discharge. All questions answered at this time.    IMM letter provided to patient.  Patient offered four hours to make informed decision regarding appeal process; patient agreeable to discharge.     Electronically signed by TIM Bullard on 5/5/2025 at 11:15 AM

## 2025-05-05 NOTE — PLAN OF CARE
Problem: Discharge Planning  Goal: Discharge to home or other facility with appropriate resources  5/5/2025 1540 by Tamie Jimenez RN  Outcome: Completed     Problem: ABCDS Injury Assessment  Goal: Absence of physical injury  5/5/2025 1540 by Tamie Jimenez RN  Outcome: Completed     Problem: Safety - Adult  Goal: Free from fall injury  5/5/2025 1540 by Tamie Jimenez RN  Outcome: Completed     Problem: Musculoskeletal - Adult  Goal: Return mobility to safest level of function  5/5/2025 1540 by Tamie Jimenez RN  Outcome: Completed     Problem: Skin/Tissue Integrity  Goal: Skin integrity remains intact  Description: 1.  Monitor for areas of redness and/or skin breakdown2.  Assess vascular access sites hourly3.  Every 4-6 hours minimum:  Change oxygen saturation probe site4.  Every 4-6 hours:  If on nasal continuous positive airway pressure, respiratory therapy assess nares and determine need for appliance change or resting period  5/5/2025 1540 by Tamie Jimenez RN  Outcome: Completed

## 2025-05-05 NOTE — CARE COORDINATION
Writer madeline cornejo/ Dr. Leija, for 5/30/25 @ 8:45 AM, Information placed on AVS, for Pt/ SNF, [facility], to follow.

## 2025-05-05 NOTE — CARE COORDINATION
Transportation arranged for patient to go to [facility] at [phone] via [facility]. Facility informed. Bedside nurse informed.     Number for Report: [phone]    Electronically signed by TIM Bullard on 5/5/2025 at 1:06 PM

## 2025-05-05 NOTE — CARE COORDINATION
attempted to make GI apt for Pt. Per , Jairon,  will need to speak to office staff to see when they can squeeze him in w/ Dr. Leija.     Per Jairon, she will have the office call writer when they are available, Will follow.      received VM, from [place], from Dr. Leija's office. Awaiting return call to schedule apt.

## 2025-05-05 NOTE — PROGRESS NOTES
[facility] Internal Medicine   Justyn Fatima MD; MD Frieda Chang MD, MD , Dewey Jeong MD    [facility] Internal Medicine   IN-PATIENT SERVICE   [facility]    Progress note            Date:   5/4/2025  Patient name:  Gustabo Ortiz  Date of admission:  5/1/2025  5:39 PM  MRN:   [id number]  Account:  [id number]  YOB: 1940  PCP:    Daniel Perez  Room:   2073/2073-01  Code Status:    Full Code    Chief Complaint:     Chief Complaint   Patient presents with    Extremity Weakness     Lower extremity weakness, onset today around 2pm.  No other complaints at this time from pt.         History Obtained From:   Patient medical record nursing staff      History of Present Illness:     Gustabo Ortiz is a 84 y.o. Non- / non  male who presents with Extremity Weakness (Lower extremity weakness, onset today around 2pm.  No other complaints at this time from pt./)   and is admitted to the hospital for the management of Weakness.    Gustabo Ortiz is a 84 y.o. Non- / non  male who presents with Extremity Weakness (Lower extremity weakness, onset today around 2pm.  No other complaints at this time from pt./)   and is admitted to the hospital for the management of Weakness.  Medical history significant for atrial fibrillation not on anticoagulation, HTN, HLD, CKD, Gastric ulcer, history of prostate cancer.      Presented to the ED per EMS after being called out 3 times for patient needing assistance due to increased weakness.  Typically patient walks with a walker independently and today is reporting increased lower extremity weakness. He does state that he was working in the garden earlier this morning and was kneeling on the ground for a short period then had problems returning to a standing with his walker. States that his legs \"just aren't moving right and can't stand\".  Denies any injury or 
    [facility] Internal Medicine  Kemar Koch MD; Pola Warner MD; Justyn Fatima MD; MD Cecilia Chang MD; Mansoor Alonzo MD  [facility] Internal Medicine   IN-PATIENT SERVICE  [facility]                 Date:   5/1/2025  Patientname:  Gustabo Ortiz  Date of admission:  5/1/2025  5:39 PM  MRN:   [id number]  Account:  [id number]  YOB: 1940  PCP:    Daniel Perez  Room:   03/03  Code Status:    Full code       Chief Complaint:     Chief Complaint   Patient presents with    Extremity Weakness     Lower extremity weakness, onset today around 2pm.  No other complaints at this time from pt.         History of Present Illness:     Gustabo Ortiz is a 84 y.o. Non- / non  male who presents with Extremity Weakness (Lower extremity weakness, onset today around 2pm.  No other complaints at this time from pt./)   and is admitted to the hospital for the management of Weakness.  Medical history significant for atrial fibrillation not on anticoagulation, HTN, HLD, CKD, Gastric ulcer, history of prostate cancer.     Presented to the ED per EMS after being called out 3 times for patient needing assistance due to increased weakness.  Typically patient walks with a walker independently and today is reporting increased lower extremity weakness. He does state that he was working in the garden earlier this morning and was kneeling on the ground for a short period then had problems returning to a standing with his walker. States that his legs \"just aren't moving right and can't stand\".  Denies any injury or numbness and tingling.  He denies any recent illness.  Patient has had weakness from UTI in the past, urinalysis currently pending. EKG atrial fibrillation with prolonged QT, nonspecific T wave abnormality. Troponin 63-57.  Denies chest pain. Patient was admitted in February 2025 for melena.  EGD revealed gastric ulcer, doing well on PPI.  Current 
   05/05/25 1433   Encounter Summary   Encounter Overview/Reason Volunteer Encounter   Assessment/Intervention/Outcome   Intervention Sustaining Presence/Ministry of presence       
[facility]   INPATIENT OCCUPATIONAL THERAPY  PROGRESS NOTE  Date: 2025  Patient Name: Gustabo Ortiz       Room:   MRN: [id number]    : 1940  (84 y.o.)  Gender: male   Referring Practitioner: Radha Connolly APRN - NP  Diagnosis: LE weakness      Discharge Recommendations:  Further Occupational Therapy is recommended upon facility discharge.    OT Equipment Recommendations  Other: TBD    Restrictions/Precautions  Restrictions/Precautions  Restrictions/Precautions: Fall Risk;General Precautions  Activity Level: Up with Assist  Required Braces or Orthoses?: No    O2 Device: None (Room air)    Subjective  Subjective  Subjective: Pt agreeable to work with skilled services.  Comments: skilled services okayed by MINAL Willett    Objective  Orientation  Overall Orientation Status: Within Normal Limits  Orientation Level: Oriented X4  Cognition  Overall Cognitive Status: Exceptions  Arousal/Alertness: Appears intact  Following Commands: Follows multistep commands with increased time;Follows multistep commands with repetition  Attention Span: Appears intact  Memory: Appears intact  Safety Judgement: Decreased awareness of need for assistance;Decreased awareness of need for safety  Problem Solving: Assistance required to generate solutions;Assistance required to implement solutions;Assistance required to identify errors made  Insights: Appears intact  Initiation: Requires cues for some  Sequencing: Requires cues for some  Cognition Comment: cues for safety with RW    Activities of Daily Living  LE Dressing: Contact guard assistance  LE Dressing Skilled Clinical Factors: PEREZ educated pt on the use of reacher to complete LB dressing task with PEREZ providing pt with CGA to guide through task PEREZ facilitated sit to stand by pt from EOB to complete LB clothingmagament by pt with PEREZ providing pt with educaution on safethf allteranting beteween supported/unsupported stands during 
Anita from [facility] called for report. Verbal report given and all questions answered. IV was removed without any complications. All personal belongings were gathered. Patient will be taken by stretcher once transportation arrives.   
Date:   5/3/2025  Patient name: Gustabo Ortiz  Date of admission:  5/1/2025  5:39 PM  MRN:   [id number]  YOB: 1940  PCP: Daniel Perez    Reason for Admission: Weakness [R53.1]  Generalized weakness [R53.1]    Cardiology follow up: A Fib , abnormal high-sensitivity troponin, profound weakness in the lower extremities unable to walk, hypokalemia serum potassium 2.8       Referring physician: Dr Justyn Fatima     Impression     Admission 5/1/2025 extreme weakness lower extremities unable to walk or stand  High-sensitivity troponin 63 and 57 with downward trend, hypokalemia on admission potassium 3.3 then dropped to 2.8  No ischemic ECG changes  A-fib heart rate under control  Normal LV systolic function ejection fraction 60% mildly dilated LA normal RV size and function RVSP 33  Admission 2/21/2025 with a GI bleed, melanotic stool, hemoglobin 8.7  EGD 2/23/2025 showed gastric ulcer 1.3 cm gastritis and duodenitis  Iron deficiency   Normal B12 665 ng/ml and folate >40 ng/ml  History of gastric ulcer, had GI bleed 15 years ago, gastroesophageal reflux disease  Hypertension  Chronic kidney disease on admission creatinine 2.6 BUN 58 significant improvement on 2/24/2025 BUN 20 creatinine 1.6  Left carotid bruit diminished carotid upstroke  History of prostate cancer  Severe lumbar spine degenerative changes L2-L5 spinal canal stenosis  Limited right shoulder movement  History of poor balance, falls    History of present illness  84-year-old male retired  who lives alone, still drives who has past medical history of hypertension, hyperlipidemia, prostate cancer, previous history of peptic ulcer disease, GI bleed about 15 years ago who has been on Prilosec got hospitalized with extreme  weakness lower extremities.  Patient states that he was doing his backyard work putting some bricks around the flower plants and he was on his knees and he felt tired, he got up and walked with his walker 
Date:   5/4/2025  Patient name: Gustabo Ortiz  Date of admission:  5/1/2025  5:39 PM  MRN:   [id number]  YOB: 1940  PCP: Daniel Perez    Reason for Admission: Weakness [R53.1]  Generalized weakness [R53.1]    Cardiology follow up: A Fib , abnormal high-sensitivity troponin, profound weakness in the lower extremities unable to walk, hypokalemia serum potassium 2.8       Referring physician: Dr Justyn Fatima     Impression     Admission 5/1/2025 extreme weakness lower extremities unable to walk or stand  High-sensitivity troponin 63 and 57 with downward trend, hypokalemia on admission potassium 3.3 then dropped to 2.8  No ischemic ECG changes  A-fib heart rate under control  Normal LV systolic function ejection fraction 60% mildly dilated LA normal RV size and function RVSP 33  Admission 2/21/2025 with a GI bleed, melanotic stool, hemoglobin 8.7  EGD 2/23/2025 showed gastric ulcer 1.3 cm gastritis and duodenitis  Iron deficiency   Normal B12  665 ng/ml and folate >40 ng/ml  History of gastric ulcer, had GI bleed 15 years ago, gastroesophageal reflux disease  Hypertension  Chronic kidney disease   Left carotid bruit diminished carotid upstroke  History of prostate cancer  Severe lumbar spine degenerative changes L2-L5 spinal canal stenosis  Limited right shoulder movement  History of poor balance, falls    History of present illness  84-year-old male retired  who lives alone, still drives who has past medical history of hypertension, hyperlipidemia, prostate cancer, previous history of peptic ulcer disease, GI bleed about 15 years ago who has been on Prilosec got hospitalized with extreme  weakness lower extremities.  Patient states that he was doing his backyard work putting some bricks around the flower plants and he was on his knees and he felt tired, he got up and walked with his walker and lasted about 15 minutes and then when he tried to stand up again he could not to stand 
Date:   5/5/2025  Patient name: Gustabo Ortiz  Date of admission:  5/1/2025  5:39 PM  MRN:   [id number]  YOB: 1940  PCP: Daniel Perez    Reason for Admission: Weakness [R53.1]  Generalized weakness [R53.1]  Hypokalemia [E87.6]    Cardiology follow up: A Fib , abnormal high-sensitivity troponin, profound weakness in the lower extremities unable to walk, hypokalemia serum potassium 2.8        Referring physician: Dr Justyn Fatima     Impression     Admission 5/1/2025 extreme weakness lower extremities unable to walk or stand  High-sensitivity troponin 63 and 57 with downward trend, hypokalemia on admission potassium 3.3 then dropped to 2.8  No ischemic ECG changes  A-fib heart rate under control  No previous history of coronary intervention no history of exertional angina no history of TIA or CVA  Normal LV systolic function ejection fraction 60% mildly dilated LA normal RV size and function RVSP 33  Admission 2/21/2025 with a GI bleed, melanotic stool, hemoglobin 8.7  EGD 2/23/2025 showed gastric ulcer 1.3 cm gastritis and duodenitis  Iron deficiency   Normal B12  665 ng/ml and folate >40 ng/ml  History of gastric ulcer, had GI bleed 15 years ago, gastroesophageal reflux disease  Hypertension  Chronic kidney disease   Left carotid bruit diminished carotid upstroke  History of prostate cancer  Severe lumbar spine degenerative changes L2-L5 spinal canal stenosis  Limited right shoulder movement  History of poor balance, falls    History of present illness  84-year-old male retired  who lives alone, still drives who has past medical history of hypertension, hyperlipidemia, prostate cancer, previous history of peptic ulcer disease, GI bleed about 15 years ago who has been on Prilosec got hospitalized with extreme  weakness lower extremities.  Patient states that he was doing his backyard work putting some bricks around the flower plants and he was on his knees and he felt tired, he 
No note, just referral for derm
Occupational Therapy Daily Treatment Note  Facility/Department: [facility] MED SURG   Patient Name: Gustabo Ortiz        MRN: [id number]    : 1940    Date of Service: 2025    Chief Complaint   Patient presents with    Extremity Weakness     Lower extremity weakness, onset today around 2pm.  No other complaints at this time from pt.       Past Medical History:  has a past medical history of Abdominal pain, Cancer (HCC), Gastric ulcer, and GERD (gastroesophageal reflux disease).  Past Surgical History:  has a past surgical history that includes Upper gastrointestinal endoscopy (11,  2011); Colonoscopy (); and Upper gastrointestinal endoscopy (N/A, 2025).    Discharge Recommendations  Discharge Recommendations: Patient would benefit from continued therapy after discharge  OT Equipment Recommendations  Other: TBD    Assessment  Performance deficits / Impairments: Decreased functional mobility ;Decreased ADL status;Decreased strength;Decreased safe awareness;Decreased endurance;Decreased balance;Decreased high-level IADLs;Decreased posture  Assessment: Pt seen for OT tx this date. Pt progressing towards OT goals, however continues to demo decreased ADL IND secondary to deficits noted above. At baseline, pt lives alone and is IND with ADLs, IADLs, and functional mobility with occasional use of RW. Pt is currently requiring MIN A for transfers, CGA for functional mobility with RW, MOD A for LBD, and MIN A for toileting. Continued OT services are warranted while pt is hospitalized and upon d/c to maximize IND and safety with functional tasks. Pt does not currently demonstrate the safe functional abilities to return to prior living arrangements with continued OT services recommended prior to eventual d/c home.  Prognosis: Good  REQUIRES OT FOLLOW-UP: Yes  Activity Tolerance  Activity Tolerance: Patient Tolerated treatment well;Patient limited by fatigue  Safety Devices  Type of Devices: All fall risk 
Ok to dc today to [facility]  Out pt with GI for eliquis clearance  Cardio recommeds 2.5 mg po bid  If clear by gi  Justyn SHARON Fatima MD  08/03/25    
Patient admitted to room 2073 in [facility]. VSS. Oriented to room and call light. Bed locked in lowest position. Call light within reach. Admission questions and assessment to follow.  
Pharmacy Medication History Note      List of current medications patient is taking is complete.    Source of information: Sure Scripts, OARRS, Care Everywhere, OARRS     Changes made to medication list:  Medications removed (include reason, ex. therapy complete or physician discontinued, noncompliance):  None     Medications flagged for provider review:  Amlodipine - last filled in Feb 2025 for 30 days    Medications added/doses adjusted:  None     Other notes (ex. Recent course of antibiotics, Coumadin dosing):  OARRS negative       Current Home Medication List at Time of Admission:  Prior to Admission medications    Medication Sig   pantoprazole (PROTONIX) 40 MG tablet Take 1 tablet by mouth 2 times daily (before meals)   lisinopril-hydroCHLOROthiazide (PRINZIDE;ZESTORETIC) 20-12.5 MG per tablet TAKE 1 TABLET BY MOUTH ONCE DAILY FOR 90 DAYS   aspirin 81 MG EC tablet Take 1 tablet by mouth daily   Cholecalciferol (VITAMIN D) 2000 UNITS TABS Take  by mouth Daily.     cetirizine (ZYRTEC) 10 MG tablet Take 1 tablet by mouth daily   simvastatin (ZOCOR) 40 MG tablet Take 1 tablet by mouth nightly   ferrous sulfate (IRON 325) 325 (65 Fe) MG tablet Take 1 tablet by mouth daily (with breakfast)   amLODIPine (NORVASC) 2.5 MG tablet Take 1 tablet by mouth daily Hold if BP less than 110/80  Patient not taking: Reported on 5/1/2025   Multiple Vitamin (MULTIVITAMIN PO) Take  by mouth daily.           Please let me know if you have any questions about this encounter. Thank you!    Electronically signed by Katie Castillo RPH on 5/1/2025 at 8:44 PM     
Physical Therapy  Facility/Department: [facility] MED SURG  Physical Therapy Initial Assessment    Name: Gustabo Ortiz  : 1940  MRN: [id number]  Date of Service: 2025    Discharge Recommendations:  Subacute/Skilled Nursing Facility          Patient Diagnosis(es): The encounter diagnosis was Generalized weakness.  Past Medical History:  has a past medical history of Abdominal pain, Cancer (HCC), Gastric ulcer, and GERD (gastroesophageal reflux disease).  Past Surgical History:  has a past surgical history that includes Upper gastrointestinal endoscopy (11,  2011); Colonoscopy (); and Upper gastrointestinal endoscopy (N/A, 2025).    Assessment  Assessment: Pt was admitted to the hospital 25 due to  weakness both lower extremities and at baseline he is independent in ambulation and transfers with a rolling walker. At this time he shows a decline in his level of functioning since he needs contact guard assistance for ambulation with a rolling walker and minimal assistance for transfers. He needs physical therapy intervention to improve his functional mobility.  Treatment Diagnosis: impaired strength and mobility  Specific Instructions for Next Treatment: ther exs and ther activities as tolerated  Therapy Prognosis: Good  Decision Making: Medium Complexity  History: Pt was admitted to the hospital 25 due t weakness both lower extremities  Exam: MMT,ROM, sensations and functional mobility testing  Clinical Presentation: Pt was alert, cooperative and motivated.  Barriers to Learning: none  Requires PT Follow-Up: Yes  Activity Tolerance  Activity Tolerance: Patient tolerated evaluation without incident    Plan  Physical Therapy Plan  General Plan: 5-7 times per week  Therapy Duration: 4-7 Days  Specific Instructions for Next Treatment: ther exs and ther activities as tolerated  Current Treatment Recommendations: Strengthening, Balance training, Functional mobility training, Transfer training, 
Report given to transportation and all questions answered.   
[facility]   Occupational Therapy Evaluation  Date: 25  Patient Name: Gustabo Ortiz       Room: -  MRN: [id number]  Account: [id number]   : 1940  (84 y.o.) Gender: male     Discharge Recommendations:  Further Occupational Therapy is recommended upon facility discharge.    OT Equipment Recommendations  Other: TBD    Referring Practitioner: Radha Connolly APRN - NP  Diagnosis: LE weakness        Treatment Diagnosis: impaired self care status    Past Medical History:  has a past medical history of Abdominal pain, Cancer (HCC), Gastric ulcer, and GERD (gastroesophageal reflux disease).    Past Surgical History:   has a past surgical history that includes Upper gastrointestinal endoscopy (11,  2011); Colonoscopy (); and Upper gastrointestinal endoscopy (N/A, 2025).    Restrictions  Restrictions/Precautions  Restrictions/Precautions: Fall Risk, General Precautions  Activity Level: Up as Tolerated, Up with Assist  Required Braces or Orthoses?: No  Implants Present? :  (pt denies)      Vitals  Vitals  O2 Device: None (Room air)     Subjective  Subjective: Pt supine upon OT arrival. pt agreeable to participate in OT eval process this afternoon. \" I was doing fine until yesterday when my knees kept giving out on me and they feel very weak\". \" I fell yesterday 3 times\". pt experienced anxiousness during session and indicated that he was fearful of falling. \" I just think my legs are too weak and I will not be able to stand at all today I dont think\".  Pt hesitant to attempt to stand but with encouragement did stand with RW and do 3 side steps with assistance towards head of bed.  Comments: MINAL burch'd pt to be seen by OT.  Pain  Pre-Pain: 0  Post-Pain: 2  Pain Location: Right, Left, Knee  Pain Descriptor: Aching  Pain Interventions: Rest, Repositioning, Nurse notified    Social/Functional History  Social/Functional History  Lives With: Alone  Type of 
Sent Dr Fatima a message regarding patients potassium lab results. Waiting for a response on orders for replacement.   
Sent Dr Ohara a message informing her of patients potassium. No orders placed. Waiting for response.   
Spoke with Dr Patton about patient consult. He stated that he would see patient.   
 6:05 AM   Result Value Ref Range    Sodium 139 136 - 145 mmol/L    Potassium 3.5 (L) 3.7 - 5.3 mmol/L    Chloride 101 98 - 107 mmol/L    CO2 29 20 - 31 mmol/L    Anion Gap 9 9 - 16 mmol/L    Glucose 101 (H) 74 - 99 mg/dL    BUN 17 8 - 23 mg/dL    Creatinine 1.5 (H) 0.7 - 1.2 mg/dL    Est, Glom Filt Rate 45 (L) >60 mL/min/1.73m2    Calcium 9.1 8.6 - 10.4 mg/dL   CBC with auto differential    Collection Time: 05/03/25  6:05 AM   Result Value Ref Range    WBC 6.4 3.5 - 11.0 k/uL    RBC 3.98 (L) 4.5 - 5.9 m/uL    Hemoglobin 11.5 (L) 13.5 - 17.5 g/dL    Hematocrit 34.4 (L) 41 - 53 %    MCV 86.6 80 - 100 fL    MCH 28.9 26 - 34 pg    MCHC 33.4 31 - 37 g/dL    RDW 15.2 (H) 11.5 - 14.9 %    Platelets 218 150 - 450 k/uL    MPV 7.7 6.0 - 12.0 fL    Neutrophils % 63 36 - 66 %    Lymphocytes % 26 24 - 44 %    Monocytes % 7 1 - 7 %    Eosinophils % 4 0 - 4 %    Basophils % 0 0 - 2 %    Neutrophils Absolute 4.00 1.3 - 9.1 k/uL    Lymphocytes Absolute 1.70 1.0 - 4.8 k/uL    Monocytes Absolute 0.50 0.1 - 1.3 k/uL    Eosinophils Absolute 0.30 0.0 - 0.4 k/uL    Basophils Absolute 0.00 0.0 - 0.2 k/uL   Magnesium    Collection Time: 05/03/25  6:05 AM   Result Value Ref Range    Magnesium 2.1 1.6 - 2.4 mg/dL       Imaging/Diagnostics:  No results found.    Assessment :      Hospital Problems           Last Modified POA    * (Principal) Weakness 5/1/2025 Yes       Plan:     Patient status inpatient in the Med/Surge    84-year-old elderly gentleman who lives in a mobile home was working in her flower bed bending down his knees he tried to walk after that had multiple falls lives alone unable to care for herself brought to emergency room, no weakness numbness as per patient, inability to walk due to knees buckling out and pain, get x-ray of the knee likely has severe arthritis,    Patient has underlying peptic ulcer disease follows with [facility] gastroenterologist on PPI plan for repeat EGD in May advised for avoid NSAID  Patient also noted

## 2025-05-05 NOTE — DISCHARGE SUMMARY
IN-PATIENT SERVICE   [facility] Internal Medicine    Discharge Summary     Patient ID: Gustabo Ortiz  :  1940   MRN: [id number]     ACCOUNT:  [id number]   Patient's PCP: Daniel Perez  Admit Date: 2025   Discharge Date: 2025    Length of Stay: 0  Code Status:  Full Code  Admitting Physician: Justyn Fatima MD  Discharge Physician: Justyn Fatima MD     Active Discharge Diagnoses:     Primary Problem  Weakness      Hospital Problems  Active Hospital Problems    Diagnosis Date Noted    Hypokalemia [E87.6] 2025    Weakness [R53.1] 2025       Admission Condition:  fair     Discharged Condition: fair    Hospital Stay:     Hospital Course:  Gustabo Ortiz is a 84 y.o. male who was admitted for the management of Weakness , presented to ER with Extremity Weakness (Lower extremity weakness, onset today around 2pm.  No other complaints at this time from pt./)    84-year-old elderly gentleman who lives in a mobile home was working in her flower bed bending down his knees he tried to walk after that had multiple falls lives alone unable to care for herself brought to emergency room, no weakness numbness as per patient, inability to walk due to knees buckling out and pain, 3 of the knees done yesterday nonacute  Strength in bilateral lower extremities 4/5,     Patient has underlying peptic ulcer disease follows with [facility] gastroenterologist on PPI plan for repeat EGD in May advised for avoid NSAID  Patient also noted to have troponin leak but no chest pain likely type II NSTEMI from falls, seen by cardiology, currently no chest pain  Underlying anemia likely from iron deficiency  Baseline hemoglobin around 10 admitted with 10.7 no active bleeding noted, hemoglobin is now 11.5,  Also noted to have severe hypokalemia potassium 2.8 likely contributing to his muscular weakness, replaced, potassium 3.5 today, replacement ordered oral replace, replaced potassium, vitamin D

## 2025-05-05 NOTE — CARE COORDINATION
DISCHARGE PLANNING NOTE:    Writer is following for potential discharge to [facility]. Authorization status checked in [employer] portal, approved.     Message placed to Gustabo for confirmation, facility accepts anytime after 1330.    [employer] message placed to Dr. Fatima requesting VASHTI completed and signed for discharge.     Call placed to bedside MINAL Willett to confirm approval and request VASHTI to be completed and signed.     Electronically signed by TIM Bullard on 5/5/2025 at 9:39 AM

## 2025-05-05 NOTE — DISCHARGE INSTR - COC
Continuity of Care Form    Patient Name: Gustabo Ortiz   :  1940  MRN:  [id number]    Admit date:  2025  Discharge date:  25    Code Status Order: Full Code   Advance Directives:     Admitting Physician:  Justyn Fatima MD  PCP: Daniel Perez    Discharging Nurse: MINAL Willett  Discharging Hospital Unit/Room#: /-01  Discharging Unit Phone Number: [phone]    Emergency Contact:   Extended Emergency Contact Information  Primary Emergency Contact: Gustabo Ortiz Jr   [facility]  Home Phone: [phone]  Relation: Child  Secondary Emergency Contact: krunal browning  Home Phone: [phone]  Relation: Child    Past Surgical History:  Past Surgical History:   Procedure Laterality Date    COLONOSCOPY      per pt, \"some polyps\"    UPPER GASTROINTESTINAL ENDOSCOPY  11,  2011    UPPER GASTROINTESTINAL ENDOSCOPY N/A 2025    ESOPHAGOGASTRODUODENOSCOPY BIOPSY performed by Farhad Leija MD at [facility]       Immunization History:   Immunization History   Administered Date(s) Administered    COVID-19, PFIZER PURPLE top, DILUTE for use, (age 12 y+), 30mcg/0.3mL 2021, 2021, 10/11/2021       Active Problems:  Patient Active Problem List   Diagnosis Code    GERD (gastroesophageal reflux disease) K21.9    Abdominal pain R10.9    Gastric ulcer K25.9    Gastrointestinal hemorrhage K92.2    Closed fracture of transverse process of lumbar vertebra with routine healing S32.009D    Lumbar spinal stenosis M48.061    Acute midline low back pain without sciatica M54.50    Melena K92.1    Anemia D64.9    Upper GI bleed K92.2    Weakness R53.1       Isolation/Infection:   Isolation            No Isolation          Patient Infection Status    None to display         Nurse Assessment:  Last Vital Signs: BP (!) 150/75   Pulse 76   Temp 97.8 °F (36.6 °C)   Resp 16   Ht 1.676 m (5' 6\")   Wt 83.9 kg (184 lb 15.5 oz)   SpO2 93%   BMI 29.85 kg/m²     Last documented pain

## 2025-05-05 NOTE — TELEPHONE ENCOUNTER
Elizabet from [facility] is calling to schedule a hospital follow up with Dr Leija. Patient is being discharged today for a GI Bleed and needs a clearance for Eliquis.  Please return Elizabet's call to [phone] as soon as possible.    Thank you.

## 2025-05-05 NOTE — PLAN OF CARE
Problem: Discharge Planning  Goal: Discharge to home or other facility with appropriate resources  5/5/2025 0221 by Felix Cutler RN  Outcome: Progressing  Flowsheets (Taken 5/4/2025 1930)  Discharge to home or other facility with appropriate resources: Identify barriers to discharge with patient and caregiver  5/4/2025 1525 by Tamie Jimenez RN  Outcome: Progressing     Problem: ABCDS Injury Assessment  Goal: Absence of physical injury  5/5/2025 0221 by Felix Cutler RN  Outcome: Progressing  Flowsheets (Taken 5/5/2025 0217)  Absence of Physical Injury: Implement safety measures based on patient assessment  5/4/2025 1525 by Tamie Jimenez RN  Outcome: Progressing     Problem: Safety - Adult  Goal: Free from fall injury  5/5/2025 0221 by Felix Cutler RN  Outcome: Progressing  Flowsheets (Taken 5/5/2025 0217)  Free From Fall Injury: Instruct family/caregiver on patient safety  5/4/2025 1525 by Tamie Jimenez RN  Outcome: Progressing     Problem: Musculoskeletal - Adult  Goal: Return mobility to safest level of function  5/5/2025 0221 by Felix Cutler RN  Outcome: Progressing  5/4/2025 1525 by Tamie Jimenez RN  Outcome: Progressing     Problem: Skin/Tissue Integrity  Goal: Skin integrity remains intact  Description: 1.  Monitor for areas of redness and/or skin breakdown2.  Assess vascular access sites hourly3.  Every 4-6 hours minimum:  Change oxygen saturation probe site4.  Every 4-6 hours:  If on nasal continuous positive airway pressure, respiratory therapy assess nares and determine need for appliance change or resting period  5/5/2025 0221 by Felix Cutler RN  Outcome: Progressing  Flowsheets  Taken 5/5/2025 0217  Skin Integrity Remains Intact: Monitor for areas of redness and/or skin breakdown  Taken 5/4/2025 1930  Skin Integrity Remains Intact: Monitor for areas of redness and/or skin breakdown  5/4/2025 1525 by Tamie Jimenez RN  Outcome: Progressing

## 2025-05-05 NOTE — CARE COORDINATION
Continuity of Care Form    Patient Name: Gustabo Ortiz   :  1940  MRN:  [id number]    Admit date:  2025  Discharge date:  25    Code Status Order: Full Code   Advance Directives:     Admitting Physician:  Justyn Fatima MD  PCP: Daniel Perez    Discharging Nurse: MINAL Willett  Discharging Hospital Unit/Room#: /-01  Discharging Unit Phone Number: [phone]    Emergency Contact:   Extended Emergency Contact Information  Primary Emergency Contact: Gustabo Ortiz Jr   [facility]  Home Phone: [phone]  Relation: Child  Secondary Emergency Contact: krunal browning  Home Phone: [phone]  Relation: Child    Past Surgical History:  Past Surgical History:   Procedure Laterality Date    COLONOSCOPY      per pt, \"some polyps\"    UPPER GASTROINTESTINAL ENDOSCOPY  11,  2011    UPPER GASTROINTESTINAL ENDOSCOPY N/A 2025    ESOPHAGOGASTRODUODENOSCOPY BIOPSY performed by Farhad Leija MD at [facility]       Immunization History:   Immunization History   Administered Date(s) Administered    COVID-19, PFIZER PURPLE top, DILUTE for use, (age 12 y+), 30mcg/0.3mL 2021, 2021, 10/11/2021       Active Problems:  Patient Active Problem List   Diagnosis Code    GERD (gastroesophageal reflux disease) K21.9    Abdominal pain R10.9    Gastric ulcer K25.9    Gastrointestinal hemorrhage K92.2    Closed fracture of transverse process of lumbar vertebra with routine healing S32.009D    Lumbar spinal stenosis M48.061    Acute midline low back pain without sciatica M54.50    Melena K92.1    Anemia D64.9    Upper GI bleed K92.2    Weakness R53.1       Isolation/Infection:   Isolation            No Isolation          Patient Infection Status    None to display         Nurse Assessment:  Last Vital Signs: BP (!) 150/75   Pulse 76   Temp 97.8 °F (36.6 °C)   Resp 16   Ht 1.676 m (5' 6\")   Wt 83.9 kg (184 lb 15.5 oz)   SpO2 93%   BMI 29.85 kg/m²     Last documented pain

## 2025-05-06 ENCOUNTER — OUTSIDE SERVICES (OUTPATIENT)
Dept: PRIMARY CARE CLINIC | Age: 85
End: 2025-05-06

## 2025-05-06 DIAGNOSIS — D64.9 ANEMIA, UNSPECIFIED TYPE: ICD-10-CM

## 2025-05-06 DIAGNOSIS — E87.6 HYPOKALEMIA: Primary | ICD-10-CM

## 2025-05-06 DIAGNOSIS — R53.1 WEAKNESS: ICD-10-CM

## 2025-05-06 ASSESSMENT — ENCOUNTER SYMPTOMS
DIARRHEA: 0
SHORTNESS OF BREATH: 0
COUGH: 0
VOMITING: 0
NAUSEA: 0

## 2025-05-06 NOTE — PROGRESS NOTES
Gustabo Ortiz is a 84 y.o. male being seen for his weekly follow up.  Location of visit: [facility]    HPI:  New today:  Pt admitted after hospital stay for weakness.  Pt had 3 falls at home and could not get up. Required EMS to come.  No c/o pain and no SOB or other issues.  Has h/o GI bleeding recently and anemia.  Hypokalemia likely contributing to muscle weakness and hospital stay.         Review of Systems   Constitutional:  Negative for activity change, appetite change, chills, diaphoresis and fever.   HENT:  Negative for congestion.    Respiratory:  Negative for cough and shortness of breath.    Cardiovascular:  Negative for chest pain and palpitations.   Gastrointestinal:  Negative for diarrhea, nausea and vomiting.   Genitourinary:  Negative for hematuria.   Musculoskeletal:  Negative for arthralgias and myalgias.   Skin:  Negative for rash.   Neurological:  Negative for weakness and headaches.   Psychiatric/Behavioral:  Negative for agitation, dysphoric mood and sleep disturbance. The patient is not nervous/anxious.           Physical Exam  Vitals reviewed.   Constitutional:       General: He is not in acute distress.  HENT:      Head: Normocephalic and atraumatic.      Nose: No congestion or rhinorrhea.   Eyes:      General:         Right eye: No discharge.         Left eye: No discharge.   Cardiovascular:      Rate and Rhythm: Normal rate and regular rhythm.   Pulmonary:      Effort: Pulmonary effort is normal. No respiratory distress.      Breath sounds: Normal breath sounds.   Abdominal:      Palpations: Abdomen is soft.      Tenderness: There is no abdominal tenderness.   Musculoskeletal:      Right lower leg: No edema.      Left lower leg: No edema.   Skin:     General: Skin is warm and dry.   Neurological:      Mental Status: He is alert. Mental status is at baseline.          ASSESSMENT:   Diagnosis Orders   1. Hypokalemia        2. Weakness        3. Anemia, unspecified type

## 2025-05-13 ENCOUNTER — OUTSIDE SERVICES (OUTPATIENT)
Dept: PRIMARY CARE CLINIC | Age: 85
End: 2025-05-13

## 2025-05-13 DIAGNOSIS — D64.9 ANEMIA, UNSPECIFIED TYPE: ICD-10-CM

## 2025-05-13 DIAGNOSIS — E87.6 HYPOKALEMIA: Primary | ICD-10-CM

## 2025-05-13 DIAGNOSIS — R53.1 WEAKNESS: ICD-10-CM

## 2025-05-13 ASSESSMENT — ENCOUNTER SYMPTOMS
COUGH: 0
VOMITING: 0
DIARRHEA: 0
NAUSEA: 0
SHORTNESS OF BREATH: 0

## 2025-05-13 NOTE — ASSESSMENT & PLAN NOTE
Causing weakness and falls.  Much improved with strength and denies any new issues.  Check levels M/T.  On spironolactone, but no potassium.  If drops will add potassium as well.

## 2025-05-13 NOTE — PROGRESS NOTES
Gustabo Ortiz is a 85 y.o. male being seen for his weekly follow up.  Location of visit: [facility]    HPI:  New today:  Pt doing well today.  Denies any new complaints or issues.  No pain or SOB.  No nausea or stomach issues.          Review of Systems   Constitutional:  Negative for activity change, appetite change, chills, diaphoresis and fever.   HENT:  Negative for congestion.    Respiratory:  Negative for cough and shortness of breath.    Cardiovascular:  Negative for chest pain and palpitations.   Gastrointestinal:  Negative for diarrhea, nausea and vomiting.   Genitourinary:  Negative for hematuria.   Musculoskeletal:  Negative for arthralgias and myalgias.   Skin:  Negative for rash.   Neurological:  Negative for weakness and headaches.   Psychiatric/Behavioral:  Negative for agitation, dysphoric mood and sleep disturbance. The patient is not nervous/anxious.           Physical Exam  Vitals reviewed.   Constitutional:       General: He is not in acute distress.  HENT:      Head: Normocephalic and atraumatic.      Nose: No congestion or rhinorrhea.   Eyes:      General:         Right eye: No discharge.         Left eye: No discharge.   Cardiovascular:      Rate and Rhythm: Normal rate and regular rhythm.   Pulmonary:      Effort: Pulmonary effort is normal. No respiratory distress.      Breath sounds: Normal breath sounds.   Abdominal:      Palpations: Abdomen is soft.      Tenderness: There is no abdominal tenderness.   Musculoskeletal:      Right lower leg: No edema.      Left lower leg: No edema.   Skin:     General: Skin is warm and dry.   Neurological:      Mental Status: He is alert. Mental status is at baseline.          ASSESSMENT:   Diagnosis Orders   1. Hypokalemia        2. Anemia, unspecified type        3. Weakness            PLAN:    1. Hypokalemia  Assessment & Plan:  Causing weakness and falls.  Much improved with strength and denies any new issues.  Check levels M/T.  On

## 2025-05-20 ENCOUNTER — OUTSIDE SERVICES (OUTPATIENT)
Dept: PRIMARY CARE CLINIC | Age: 85
End: 2025-05-20

## 2025-05-20 DIAGNOSIS — D64.9 ANEMIA, UNSPECIFIED TYPE: ICD-10-CM

## 2025-05-20 DIAGNOSIS — E87.6 HYPOKALEMIA: Primary | ICD-10-CM

## 2025-05-20 DIAGNOSIS — R53.1 WEAKNESS: ICD-10-CM

## 2025-05-20 ASSESSMENT — ENCOUNTER SYMPTOMS
VOMITING: 0
COUGH: 0
DIARRHEA: 0
NAUSEA: 0
SHORTNESS OF BREATH: 0

## 2025-05-20 NOTE — PROGRESS NOTES
Gustabo Ortiz is a 85 y.o. male being seen for his weekly follow up.  Location of visit: [facility]    HPI:  New today:  Pt denies any new complaints.  Tired from therapy today. Going home tomorrow.          Review of Systems   Constitutional:  Negative for activity change, appetite change, chills, diaphoresis and fever.   HENT:  Negative for congestion.    Respiratory:  Negative for cough and shortness of breath.    Cardiovascular:  Negative for chest pain and palpitations.   Gastrointestinal:  Negative for diarrhea, nausea and vomiting.   Genitourinary:  Negative for hematuria.   Musculoskeletal:  Negative for arthralgias and myalgias.   Skin:  Negative for rash.   Neurological:  Negative for weakness and headaches.   Psychiatric/Behavioral:  Negative for agitation, dysphoric mood and sleep disturbance. The patient is not nervous/anxious.           Physical Exam  Vitals reviewed.   Constitutional:       General: He is not in acute distress.  HENT:      Head: Normocephalic and atraumatic.      Nose: No congestion or rhinorrhea.   Eyes:      General:         Right eye: No discharge.         Left eye: No discharge.   Cardiovascular:      Rate and Rhythm: Normal rate and regular rhythm.   Pulmonary:      Effort: Pulmonary effort is normal. No respiratory distress.      Breath sounds: Normal breath sounds.   Abdominal:      Palpations: Abdomen is soft.      Tenderness: There is no abdominal tenderness.   Musculoskeletal:      Right lower leg: No edema.      Left lower leg: No edema.   Skin:     General: Skin is warm and dry.   Neurological:      Mental Status: He is alert. Mental status is at baseline.          ASSESSMENT:   Diagnosis Orders   1. Hypokalemia        2. Weakness        3. Anemia, unspecified type            PLAN:    1. Hypokalemia  Assessment & Plan:  Improved. Monitor as OP as well.    2. Weakness  Assessment & Plan:  Continue with home nursing and therapy    3. Anemia, unspecified

## 2025-07-11 ENCOUNTER — OFFICE VISIT (OUTPATIENT)
Dept: PODIATRY | Age: 85
End: 2025-07-11
Payer: MEDICARE

## 2025-07-11 VITALS — BODY MASS INDEX: 29.57 KG/M2 | HEIGHT: 66 IN | WEIGHT: 184 LBS

## 2025-07-11 DIAGNOSIS — M79.675 PAIN OF TOES OF BOTH FEET: ICD-10-CM

## 2025-07-11 DIAGNOSIS — M79.674 PAIN OF TOES OF BOTH FEET: ICD-10-CM

## 2025-07-11 DIAGNOSIS — B35.1 ONYCHOMYCOSIS OF TOENAIL: Primary | ICD-10-CM

## 2025-07-11 PROCEDURE — 99999 PR OFFICE/OUTPT VISIT,PROCEDURE ONLY: CPT | Performed by: PODIATRIST

## 2025-07-11 PROCEDURE — 11721 DEBRIDE NAIL 6 OR MORE: CPT | Performed by: PODIATRIST

## 2025-07-14 ASSESSMENT — ENCOUNTER SYMPTOMS
NAUSEA: 0
BACK PAIN: 0
COLOR CHANGE: 0
SHORTNESS OF BREATH: 0
DIARRHEA: 0

## 2025-07-14 NOTE — PROGRESS NOTES
SUBJECTIVE: Gustabo Ortiz is a 85 y.o. male who returns to the office with chief complaint of painful fungal toenails. Patient relates toe nails are thickened/difficult to trim as well as painful with ambulation and with shoe gear.   Chief Complaint   Patient presents with    Nail Problem     Nail trim/last saw Dr.Robert Perez 7/10/25     Review of Systems   Constitutional:  Negative for activity change, appetite change, chills, diaphoresis, fatigue and fever.   Respiratory:  Negative for shortness of breath.    Cardiovascular:  Negative for leg swelling.   Gastrointestinal:  Negative for diarrhea and nausea.   Endocrine: Negative for cold intolerance, heat intolerance and polyuria.   Musculoskeletal:  Positive for arthralgias. Negative for back pain, gait problem, joint swelling and myalgias.   Skin:  Negative for color change, pallor, rash and wound.   Allergic/Immunologic: Negative for environmental allergies and food allergies.   Neurological:  Negative for dizziness, weakness, light-headedness and numbness.   Hematological:  Does not bruise/bleed easily.   Psychiatric/Behavioral:  Negative for behavioral problems, confusion and self-injury. The patient is not nervous/anxious.      OBJECTIVE: Clinical evaluation of patient reveals nails 1,2,3,4,5 of the right foot and nails 1,2,3,4,5 of the left foot to present with thickness, elongation, discoloration, brittleness, and subungual debris. There was pain with palpation and debridement of the toenails of the bilateral feet. No open lesions noted to either foot today.     Class A Findings (1 needed)   [] Non-traumatic amputation of foot or integral skeleton portion thereof.   [] Q7.      Class B Findings (2 needed)   1. [] Absent posterior tibial pulse   2. [] Absent dorsalis pedis pulse   3. [] Advanced trophic changes; three of the following are required:   ·         [] hair growth (decrease or absence)   ·         [] nail changes (thickening)   ·         []

## (undated) DEVICE — ENDO KIT W/SYRINGE: Brand: MEDLINE INDUSTRIES, INC.

## (undated) DEVICE — FORCEPS BX L240CM JAW DIA2.8MM L CAP W/ NDL MIC MESH TOOTH

## (undated) DEVICE — BITEBLOCK 54FR W/ DENT RIM BLOX

## (undated) DEVICE — DEFENDO AIR WATER SUCTION AND BIOPSY VALVE KIT FOR  OLYMPUS: Brand: DEFENDO AIR/WATER/SUCTION AND BIOPSY VALVE